# Patient Record
Sex: FEMALE | Race: WHITE | Employment: FULL TIME | URBAN - METROPOLITAN AREA
[De-identification: names, ages, dates, MRNs, and addresses within clinical notes are randomized per-mention and may not be internally consistent; named-entity substitution may affect disease eponyms.]

---

## 2019-07-16 ENCOUNTER — OFFICE VISIT (OUTPATIENT)
Dept: FAMILY MEDICINE CLINIC | Age: 34
End: 2019-07-16

## 2019-07-16 ENCOUNTER — HOSPITAL ENCOUNTER (OUTPATIENT)
Dept: LAB | Age: 34
Discharge: HOME | End: 2019-07-16
Payer: COMMERCIAL

## 2019-07-16 VITALS
BODY MASS INDEX: 30.05 KG/M2 | TEMPERATURE: 98.4 F | HEIGHT: 64 IN | SYSTOLIC BLOOD PRESSURE: 127 MMHG | RESPIRATION RATE: 18 BRPM | HEART RATE: 121 BPM | OXYGEN SATURATION: 98 % | DIASTOLIC BLOOD PRESSURE: 84 MMHG | WEIGHT: 176 LBS

## 2019-07-16 DIAGNOSIS — E06.3 HASHIMOTO'S DISEASE: Primary | ICD-10-CM

## 2019-07-16 DIAGNOSIS — R00.0 TACHYCARDIA: ICD-10-CM

## 2019-07-16 DIAGNOSIS — E06.3 HASHIMOTO'S DISEASE: ICD-10-CM

## 2019-07-16 LAB
T4 FREE SERPL-MCNC: 0.9 NG/DL (ref 0.7–1.5)
TSH SERPL DL<=0.05 MIU/L-ACNC: 0.03 UIU/ML (ref 0.36–3.74)

## 2019-07-16 PROCEDURE — 36415 COLL VENOUS BLD VENIPUNCTURE: CPT

## 2019-07-16 PROCEDURE — 84443 ASSAY THYROID STIM HORMONE: CPT

## 2019-07-16 PROCEDURE — 84439 ASSAY OF FREE THYROXINE: CPT

## 2019-07-16 PROCEDURE — 93005 ELECTROCARDIOGRAM TRACING: CPT

## 2019-07-16 RX ORDER — LIOTHYRONINE SODIUM 5 UG/1
5 TABLET ORAL DAILY
COMMUNITY
End: 2020-06-30 | Stop reason: SDUPTHER

## 2019-07-16 RX ORDER — LEVOTHYROXINE SODIUM 88 UG/1
TABLET ORAL
Refills: 2 | COMMUNITY
Start: 2019-05-02 | End: 2020-02-28 | Stop reason: SDUPTHER

## 2019-07-16 RX ORDER — AMLODIPINE AND BENAZEPRIL HYDROCHLORIDE 5; 10 MG/1; MG/1
1 CAPSULE ORAL DAILY
COMMUNITY
End: 2019-07-22 | Stop reason: SDUPTHER

## 2019-07-16 RX ORDER — ATENOLOL 50 MG/1
50 TABLET ORAL DAILY
Qty: 30 TAB | Refills: 0 | Status: SHIPPED | OUTPATIENT
Start: 2019-07-16 | End: 2019-08-13 | Stop reason: SDUPTHER

## 2019-07-16 NOTE — PROGRESS NOTES
HPI  Mariann Jasso is a 29 y.o. female who presents today to establish care with new provider. Pt recently relocated to [facility] following a recent divorce. Pt is under the care of an endocrinologist, history of hashimoto's disease. Pt was last seen by endocrinologist in February 2019, last thyroid labs were in October 2018. Pt has been off beta blockers for tachycardia for about 2 years and notes when she was off the medication her heart rate was improved with better management of her thyroid disease. Pt is currently just taking prescriptive medication for thyroid condition and HTN. Pt denies chest pain, SOB, fatigue, dizziness, swelling to lower extremities and palpitations. Pt denies any acute medical concerns today. Current Outpatient Medications   Medication Sig Dispense Refill    levothyroxine (SYNTHROID) 88 mcg tablet TK 1 T PO QAM  2    liothyronine (CYTOMEL) 5 mcg tablet Take 5 mcg by mouth daily.  amLODIPine-benazepril (LOTREL) 5-10 mg per capsule Take 1 Cap by mouth daily.  atenolol (TENORMIN) 100 mg tablet Take 100 mg by mouth daily.  atenolol (TENORMIN) 50 mg tablet Take 50 mg by mouth daily. Allergies   Allergen Reactions    Latex Rash       SUBJECTIVE:  Review of Systems   Constitutional: Negative for chills, fever and malaise/fatigue. Eyes: Negative for blurred vision. Respiratory: Negative for shortness of breath. Cardiovascular: Negative for chest pain, palpitations and leg swelling. Gastrointestinal: Negative for abdominal pain, diarrhea, nausea and vomiting. Genitourinary: Negative for dysuria, frequency and urgency. Musculoskeletal: Negative for falls and myalgias. Neurological: Negative for dizziness, tingling, sensory change and headaches.         OBJECTIVE:  Visit Vitals  /84 (BP 1 Location: Left arm, BP Patient Position: Sitting)   Pulse (!) 121   Temp 98.4 °F (36.9 °C) (Oral)   Resp 18   Ht 5' 4\" (1.626 m)   Wt 176 lb (79.8 kg) SpO2 98%   BMI 30.21 kg/m²      I have reviewed/discussed the above normal BMI with the patient. I have recommended the following interventions: dietary management education, guidance, and counseling, encourage exercise and monitor weight . Physical Exam   Constitutional: She is oriented to person, place, and time and well-developed, well-nourished, and in no distress. HENT:   Head: Normocephalic. Eyes: Pupils are equal, round, and reactive to light. Conjunctivae and EOM are normal.   Neck: No thyromegaly present. Cardiovascular: Normal rate, regular rhythm and normal heart sounds. Pulmonary/Chest: Effort normal and breath sounds normal. She has no wheezes. She has no rales. She exhibits no tenderness. Abdominal: Soft. Bowel sounds are normal. She exhibits no distension. There is no tenderness. There is no guarding. Musculoskeletal: She exhibits no edema. Neurological: She is alert and oriented to person, place, and time. Gait normal.   Skin: Skin is warm and dry. No erythema. Psychiatric: Mood and affect normal.   Nursing note and vitals reviewed. ASSESSMENT:  Diagnoses and all orders for this visit:    1. Hashimoto's disease  -     TSH 3RD GENERATION; Future  -     T4, FREE; Future    2. Tachycardia  -     atenolol (TENORMIN) 50 mg tablet; Take 1 Tab by mouth daily.  -     EKG, 12 LEAD, INITIAL; Future    PLAN:  Labs today  EKG ordered  Restart Atenolol for tachycardia    I have discussed the diagnosis with the patient and the intended plan as seen in the above orders. The patient has received an after-visit summary and questions were answered concerning future plans. I have discussed medication side effects and warnings with the patient as well. Patient will call for further questions. Follow-up and Dispositions    · Return in about 1 month (around 8/13/2019) for tachycardia.        Caprice Worthington NP

## 2019-07-16 NOTE — PROGRESS NOTES
Chief Complaint   Patient presents with    Medication Refill    Establish Care     1. Have you been to the ER, urgent care clinic since your last visit? Hospitalized since your last visit? No    2. Have you seen or consulted any other health care providers outside of the [address] [place] [address] since your last visit? Include any pap smears or colon screening.  No

## 2019-07-16 NOTE — PATIENT INSTRUCTIONS
Hashimoto's Thyroiditis: Care Instructions  Your Care Instructions    Hashimoto's thyroiditis is a problem with the thyroid gland. The thyroid gland, which is in your neck, controls the way your body uses energy. Sometimes the disease causes the gland to make too much thyroid hormone (thyrotoxicosis). This can make you feel nervous, lose weight, and have many loose bowel movements. You may also have a fast heartbeat. But as the disease progresses, the gland usually does not make enough thyroid hormone. This can cause you to feel tired and have dry skin and thinning hair. Most people with Hashimoto's are diagnosed when they have these symptoms. You may need to take medicine if you have symptoms or if your thyroid hormone level is not normal. Most people with Hashimoto's thyroiditis need to take medicine for the rest of their lives. Follow-up care is a key part of your treatment and safety. Be sure to make and go to all appointments, and call your doctor if you are having problems. It's also a good idea to know your test results and keep a list of the medicines you take. How can you care for yourself at home? · Take your medicines exactly as prescribed. Call your doctor if you think you are having a problem with your medicine. You will get more details on the specific medicines your doctor prescribes. When should you call for help? Call 911 anytime you think you may need emergency care.  For example, call if:    · You passed out (lost consciousness).     · You have severe trouble breathing.     · You have a very slow heartbeat (less than 60 beats a minute).     · You have a low body temperature (95°F or below).    Watch closely for changes in your health, and be sure to contact your doctor if:    · You gain weight even though you are eating normally or less than usual.     · You feel extremely weak or tired.     · You have new changes in your skin, nails, or hair, or the changes get worse.     · You notice that your thyroid gland has grown or changed in size.     · You have constipation that is new or that gets worse.     · You cannot stand cold temperatures.     · You have heavy or irregular menstrual periods.     · You have other new symptoms. Where can you learn more? Go to http://stanley-bud.info/. Enter K454 in the search box to learn more about \"Hashimoto's Thyroiditis: Care Instructions. \"  Current as of: March 14, 2018  Content Version: 11.9  © 5941-8737 [employer]. Care instructions adapted under license by [employer] (which disclaims liability or warranty for this information). If you have questions about a medical condition or this instruction, always ask your healthcare professional. [address] any warranty or liability for your use of this information.

## 2019-07-17 LAB
ATRIAL RATE: 98 BPM
CALCULATED P AXIS, ECG09: 38 DEGREES
CALCULATED R AXIS, ECG10: 40 DEGREES
CALCULATED T AXIS, ECG11: 25 DEGREES
DIAGNOSIS, 93000: NORMAL
P-R INTERVAL, ECG05: 150 MS
Q-T INTERVAL, ECG07: 348 MS
QRS DURATION, ECG06: 92 MS
QTC CALCULATION (BEZET), ECG08: 444 MS
VENTRICULAR RATE, ECG03: 98 BPM

## 2019-08-13 ENCOUNTER — OFFICE VISIT (OUTPATIENT)
Dept: FAMILY MEDICINE CLINIC | Age: 34
End: 2019-08-13

## 2019-08-13 VITALS
OXYGEN SATURATION: 96 % | WEIGHT: 174.8 LBS | DIASTOLIC BLOOD PRESSURE: 77 MMHG | SYSTOLIC BLOOD PRESSURE: 113 MMHG | HEART RATE: 70 BPM | RESPIRATION RATE: 18 BRPM | HEIGHT: 64 IN | BODY MASS INDEX: 29.84 KG/M2 | TEMPERATURE: 98.3 F

## 2019-08-13 DIAGNOSIS — R00.0 TACHYCARDIA: ICD-10-CM

## 2019-08-13 DIAGNOSIS — I10 ESSENTIAL HYPERTENSION: Primary | ICD-10-CM

## 2019-08-13 RX ORDER — AMLODIPINE AND BENAZEPRIL HYDROCHLORIDE 5; 10 MG/1; MG/1
1 CAPSULE ORAL DAILY
Qty: 90 CAP | Refills: 1 | Status: SHIPPED | OUTPATIENT
Start: 2019-08-13 | End: 2020-02-28 | Stop reason: SDUPTHER

## 2019-08-13 RX ORDER — ATENOLOL 50 MG/1
50 TABLET ORAL DAILY
Qty: 90 TAB | Refills: 1 | Status: SHIPPED | OUTPATIENT
Start: 2019-08-13 | End: 2020-02-28 | Stop reason: SDUPTHER

## 2019-08-13 NOTE — PATIENT INSTRUCTIONS
High Blood Pressure: Care Instructions  Overview    It's normal for blood pressure to go up and down throughout the day. But if it stays up, you have high blood pressure. Another name for high blood pressure is hypertension. Despite what a lot of people think, high blood pressure usually doesn't cause headaches or make you feel dizzy or lightheaded. It usually has no symptoms. But it does increase your risk of stroke, heart attack, and other problems. You and your doctor will talk about your risks of these problems based on your blood pressure. Your doctor will give you a goal for your blood pressure. Your goal will be based on your health and your age. Lifestyle changes, such as eating healthy and being active, are always important to help lower blood pressure. You might also take medicine to reach your blood pressure goal.  Follow-up care is a key part of your treatment and safety. Be sure to make and go to all appointments, and call your doctor if you are having problems. It's also a good idea to know your test results and keep a list of the medicines you take. How can you care for yourself at home? Medical treatment  · If you stop taking your medicine, your blood pressure will go back up. You may take one or more types of medicine to lower your blood pressure. Be safe with medicines. Take your medicine exactly as prescribed. Call your doctor if you think you are having a problem with your medicine. · Talk to your doctor before you start taking aspirin every day. Aspirin can help certain people lower their risk of a heart attack or stroke. But taking aspirin isn't right for everyone, because it can cause serious bleeding. · See your doctor regularly. You may need to see the doctor more often at first or until your blood pressure comes down. · If you are taking blood pressure medicine, talk to your doctor before you take decongestants or anti-inflammatory medicine, such as ibuprofen.  Some of these medicines can raise blood pressure. · Learn how to check your blood pressure at home. Lifestyle changes  · Stay at a healthy weight. This is especially important if you put on weight around the waist. Losing even 10 pounds can help you lower your blood pressure. · If your doctor recommends it, get more exercise. Walking is a good choice. Bit by bit, increase the amount you walk every day. Try for at least 30 minutes on most days of the week. You also may want to swim, bike, or do other activities. · Avoid or limit alcohol. Talk to your doctor about whether you can drink any alcohol. · Try to limit how much sodium you eat to less than 2,300 milligrams (mg) a day. Your doctor may ask you to try to eat less than 1,500 mg a day. · Eat plenty of fruits (such as bananas and oranges), vegetables, legumes, whole grains, and low-fat dairy products. · Lower the amount of saturated fat in your diet. Saturated fat is found in animal products such as milk, cheese, and meat. Limiting these foods may help you lose weight and also lower your risk for heart disease. · Do not smoke. Smoking increases your risk for heart attack and stroke. If you need help quitting, talk to your doctor about stop-smoking programs and medicines. These can increase your chances of quitting for good. When should you call for help? Call 911 anytime you think you may need emergency care. This may mean having symptoms that suggest that your blood pressure is causing a serious heart or blood vessel problem. Your blood pressure may be over 180/120.   For example, call 911 if:    · You have symptoms of a heart attack. These may include:  ? Chest pain or pressure, or a strange feeling in the chest.  ? Sweating. ? Shortness of breath. ? Nausea or vomiting. ? Pain, pressure, or a strange feeling in the back, neck, jaw, or upper belly or in one or both shoulders or arms. ? Lightheadedness or sudden weakness.   ? A fast or irregular heartbeat.     · You have symptoms of a stroke. These may include:  ? Sudden numbness, tingling, weakness, or loss of movement in your face, arm, or leg, especially on only one side of your body. ? Sudden vision changes. ? Sudden trouble speaking. ? Sudden confusion or trouble understanding simple statements. ? Sudden problems with walking or balance. ? A sudden, severe headache that is different from past headaches.     · You have severe back or belly pain.    Do not wait until your blood pressure comes down on its own. Get help right away.   Call your doctor now or seek immediate care if:    · Your blood pressure is much higher than normal (such as 180/120 or higher), but you don't have symptoms.     · You think high blood pressure is causing symptoms, such as:  ? Severe headache.  ? Blurry vision.    Watch closely for changes in your health, and be sure to contact your doctor if:    · Your blood pressure measures higher than your doctor recommends at least 2 times. That means the top number is higher or the bottom number is higher, or both.     · You think you may be having side effects from your blood pressure medicine. Where can you learn more? Go to http://stanley-bud.info/. Enter M607 in the search box to learn more about \"High Blood Pressure: Care Instructions. \"  Current as of: July 22, 2018  Content Version: 12.1  © 5275-5429 [employer]. Care instructions adapted under license by [employer] (which disclaims liability or warranty for this information). If you have questions about a medical condition or this instruction, always ask your healthcare professional. [place] [address] any warranty or liability for your use of this information.

## 2019-08-13 NOTE — PROGRESS NOTES
Chief Complaint   Patient presents with    Irregular Heart Beat     1. Have you been to the ER, urgent care clinic since your last visit? Hospitalized since your last visit? No    2. Have you seen or consulted any other health care providers outside of the [address] [place] [address] since your last visit? Include any pap smears or colon screening.  No

## 2019-08-17 NOTE — PROGRESS NOTES
TRI Castillo is a 29 y.o. female who presents today for HTN and tachycardia follow up. Hypertension ROS: taking medications as instructed, no medication side effects noted, no TIA's, no chest pain on exertion, no dyspnea on exertion, no swelling of ankles. Pt denies any acute medical concerns today. Pt had EKG done for tachycardia which was ordered last visit. Current Outpatient Medications   Medication Sig Dispense Refill    atenolol (TENORMIN) 50 mg tablet Take 1 Tab by mouth daily. 90 Tab 1    amLODIPine-benazepril (LOTREL) 5-10 mg per capsule Take 1 Cap by mouth daily. 90 Cap 1    levothyroxine (SYNTHROID) 88 mcg tablet TK 1 T PO QAM  2    liothyronine (CYTOMEL) 5 mcg tablet Take 5 mcg by mouth daily. Allergies   Allergen Reactions    Latex Rash       SUBJECTIVE:  Review of Systems   Constitutional: Negative for chills, fever and malaise/fatigue. Eyes: Negative for blurred vision. Respiratory: Negative for shortness of breath. Cardiovascular: Negative for chest pain, palpitations and leg swelling. Gastrointestinal: Negative for abdominal pain, diarrhea, nausea and vomiting. Genitourinary: Negative for dysuria, frequency and urgency. Musculoskeletal: Negative for falls and myalgias. Neurological: Negative for dizziness, tingling, sensory change and headaches. Psychiatric/Behavioral: Negative for depression and suicidal ideas. The patient is not nervous/anxious. OBJECTIVE:  Visit Vitals  /77 (BP 1 Location: Left arm, BP Patient Position: Sitting)   Pulse 70   Temp 98.3 °F (36.8 °C) (Oral)   Resp 18   Ht 5' 4\" (1.626 m)   Wt 174 lb 12.8 oz (79.3 kg)   LMP 07/30/2019 (Approximate)   SpO2 96%   BMI 30.00 kg/m²      I have reviewed/discussed the above normal BMI with the patient. I have recommended the following interventions: dietary management education, guidance, and counseling, encourage exercise and monitor weight .      Physical Exam   Constitutional: She is oriented to person, place, and time and well-developed, well-nourished, and in no distress. HENT:   Head: Normocephalic. Eyes: Pupils are equal, round, and reactive to light. Conjunctivae and EOM are normal.   Neck: No thyromegaly present. Cardiovascular: Normal rate, regular rhythm and normal heart sounds. Pulmonary/Chest: Effort normal and breath sounds normal. She has no wheezes. She has no rales. She exhibits no tenderness. Musculoskeletal: She exhibits no edema. Neurological: She is alert and oriented to person, place, and time. Gait normal.   Skin: Skin is warm and dry. No erythema. Psychiatric: Mood and affect normal.   Nursing note and vitals reviewed. ASSESSMENT:  Diagnoses and all orders for this visit:    1. Essential hypertension  -     amLODIPine-benazepril (LOTREL) 5-10 mg per capsule; Take 1 Cap by mouth daily. 2. Tachycardia  -     atenolol (TENORMIN) 50 mg tablet; Take 1 Tab by mouth daily. PLAN:  Medication refill  Reviewed EKG results  Continue current medication regimen    I have discussed the diagnosis with the patient and the intended plan as seen in the above orders. The patient has received an after-visit summary and questions were answered concerning future plans. I have discussed medication side effects and warnings with the patient as well. Patient will call for further questions. Follow-up and Dispositions    · Return in about 6 months (around 2/13/2020) for follow up.        Umesh Louie NP

## 2020-02-28 DIAGNOSIS — I10 ESSENTIAL HYPERTENSION: ICD-10-CM

## 2020-02-28 DIAGNOSIS — R00.0 TACHYCARDIA: ICD-10-CM

## 2020-02-28 RX ORDER — AMLODIPINE AND BENAZEPRIL HYDROCHLORIDE 5; 10 MG/1; MG/1
1 CAPSULE ORAL DAILY
Qty: 90 CAP | Refills: 0 | Status: SHIPPED | OUTPATIENT
Start: 2020-02-28 | End: 2020-04-28 | Stop reason: SDUPTHER

## 2020-02-28 RX ORDER — LEVOTHYROXINE SODIUM 88 UG/1
TABLET ORAL
Qty: 30 TAB | Refills: 3 | Status: SHIPPED | OUTPATIENT
Start: 2020-02-28 | End: 2020-07-15 | Stop reason: SDUPTHER

## 2020-02-28 RX ORDER — ATENOLOL 50 MG/1
50 TABLET ORAL DAILY
Qty: 90 TAB | Refills: 0 | Status: SHIPPED | OUTPATIENT
Start: 2020-02-28 | End: 2020-04-28 | Stop reason: SDUPTHER

## 2020-04-28 ENCOUNTER — VIRTUAL VISIT (OUTPATIENT)
Dept: FAMILY MEDICINE CLINIC | Age: 35
End: 2020-04-28

## 2020-04-28 ENCOUNTER — PATIENT MESSAGE (OUTPATIENT)
Dept: FAMILY MEDICINE CLINIC | Age: 35
End: 2020-04-28

## 2020-04-28 DIAGNOSIS — I10 ESSENTIAL HYPERTENSION: ICD-10-CM

## 2020-04-28 DIAGNOSIS — I10 ESSENTIAL HYPERTENSION: Primary | ICD-10-CM

## 2020-04-28 DIAGNOSIS — R00.0 TACHYCARDIA: ICD-10-CM

## 2020-04-28 DIAGNOSIS — E06.3 HASHIMOTO'S DISEASE: ICD-10-CM

## 2020-04-28 RX ORDER — AMLODIPINE AND BENAZEPRIL HYDROCHLORIDE 5; 10 MG/1; MG/1
1 CAPSULE ORAL DAILY
Qty: 30 CAP | Refills: 0 | Status: SHIPPED | OUTPATIENT
Start: 2020-04-28 | End: 2020-08-28

## 2020-04-28 RX ORDER — ATENOLOL 50 MG/1
50 TABLET ORAL DAILY
Qty: 30 TAB | Refills: 0 | Status: SHIPPED | OUTPATIENT
Start: 2020-04-28 | End: 2020-06-30

## 2020-04-28 NOTE — PROGRESS NOTES
Walter Prado is a 36 y.o. female who was seen by synchronous (real-time) audio-video technology on 4/28/2020. Consent: Walter Prado, who was seen by synchronous (real-time) audio-video technology, and/or her healthcare decision maker, is aware that this patient-initiated, Telehealth encounter on 4/28/2020 is a billable service, with coverage as determined by her insurance carrier. She is aware that she may receive a bill and has provided verbal consent to proceed: Yes. Assessment & Plan:   Diagnoses and all orders for this visit:    1. Essential hypertension  -     LIPID PANEL; Future  -     METABOLIC PANEL, COMPREHENSIVE; Future    2. Tachycardia    3. Hashimoto's disease  -     T4, FREE; Future  -     TSH 3RD GENERATION; Future      Instructed to schedule lab appt to have fasting labs drawn    Follow-up and Dispositions    · Return in about 3 months (around 7/28/2020) for HTN. I spent at least 16 minutes on this visit with this established patient. [phone]  Subjective: Walter Prado is a 36 y.o. female who was seen for Hypertension    Hypertension ROS: taking medications as instructed, no medication side effects noted, no TIA's, no chest pain on exertion, no dyspnea on exertion, no swelling of ankles. Hypothyroidism ros: denies fatigue, weight changes, heat/cold intolerance, bowel/skin changes or CVS symptoms. Patient is compliant with all meds. Medication side effects none. Patient is followed by Dr. Tamara Ganser for the her thyroid. Prior to Admission medications    Medication Sig Start Date End Date Taking? Authorizing Provider   amLODIPine-benazepril (LOTREL) 5-10 mg per capsule Take 1 Cap by mouth daily. 4/28/20   Missael Jacobson MD   atenoloL (TENORMIN) 50 mg tablet Take 1 Tab by mouth daily. 4/28/20   Missael Jacobson MD   levothyroxine (SYNTHROID) 88 mcg tablet TK 1 T PO QAM 2/28/20   Antonio Nair NP   atenoloL (TENORMIN) 50 mg tablet Take 1 Tab by mouth daily.  2/28/20 4/28/20 Roseanna Gillespie, NP   amLODIPine-benazepril (LOTREL) 5-10 mg per capsule Take 1 Cap by mouth daily. 2/28/20 4/28/20  Roseanna Gillespie, NP   liothyronine (CYTOMEL) 5 mcg tablet Take 5 mcg by mouth daily. Provider, Historical     Allergies   Allergen Reactions    Latex Rash       There is no problem list on file for this patient. There are no active problems to display for this patient. Current Outpatient Medications   Medication Sig Dispense Refill    amLODIPine-benazepril (LOTREL) 5-10 mg per capsule Take 1 Cap by mouth daily. 30 Cap 0    atenoloL (TENORMIN) 50 mg tablet Take 1 Tab by mouth daily. 30 Tab 0    levothyroxine (SYNTHROID) 88 mcg tablet TK 1 T PO QAM 30 Tab 3    liothyronine (CYTOMEL) 5 mcg tablet Take 5 mcg by mouth daily. Allergies   Allergen Reactions    Latex Rash     Past Medical History:   Diagnosis Date    Hypertension     Tachycardia, unspecified     being evaluated by cardiologist.   Connye Sole Thyroid disease      Past Surgical History:   Procedure Laterality Date    HX TUBAL LIGATION       Family History   Problem Relation Age of Onset    Diabetes Mother     Heart Disease Mother     Hypertension Mother      Social History     Tobacco Use    Smoking status: Never Smoker    Smokeless tobacco: Never Used   Substance Use Topics    Alcohol use: Yes     Alcohol/week: 1.0 standard drinks     Types: 1 Standard drinks or equivalent per week       Review of Systems   Constitutional: Negative for chills and fever. Respiratory: Negative for shortness of breath. Cardiovascular: Negative for chest pain and palpitations. Neurological: Negative for dizziness and headaches. Objective: There were no vitals taken for this visit.    General: alert, cooperative, no distress   Mental  status: normal mood, behavior, speech, dress, motor activity, and thought processes, able to follow commands   HENT: NCAT   Neck: no visualized mass   Resp: no respiratory distress   Neuro: no gross deficits   Skin: no discoloration or lesions of concern on visible areas   Psychiatric: normal affect, consistent with stated mood, no evidence of hallucinations     Additional exam findings: We discussed the expected course, resolution and complications of the diagnosis(es) in detail. Medication risks, benefits, costs, interactions, and alternatives were discussed as indicated. I advised her to contact the office if her condition worsens, changes or fails to improve as anticipated. She expressed understanding with the diagnosis(es) and plan. Dallas Belle is a 36 y.o. female who was evaluated by a video visit encounter for concerns as above. Patient identification was verified prior to start of the visit. A caregiver was present when appropriate. Due to this being a TeleHealth encounter ([facility]), evaluation of the following organ systems was limited: Vitals/Constitutional/EENT/Resp/CV/GI//MS/Neuro/Skin/Heme-Lymph-Imm. Pursuant to the emergency declaration under the [facility][note 3][facility] waiver authority and the [employer] Act, this Virtual  Visit was conducted, with patient's (and/or legal guardian's) consent, to reduce the patient's risk of exposure to COVID-19 and provide necessary medical care. Services were provided through a video synchronous discussion virtually to substitute for in-person clinic visit. Patient and provider were located at their individual homes.       Raine Montejo NP

## 2020-04-28 NOTE — TELEPHONE ENCOUNTER
Last Visit: 8/13/19 with SMITHA Delgado  Next Appointment: advised to f/u in 6 months  Previous Refill Encounter(s): 2/28/20 #90    Requested Prescriptions     Pending Prescriptions Disp Refills    amLODIPine-benazepril (LOTREL) 5-10 mg per capsule 30 Cap 0     Sig: Take 1 Cap by mouth daily.  atenoloL (TENORMIN) 50 mg tablet 30 Tab 0     Sig: Take 1 Tab by mouth daily.

## 2020-04-28 NOTE — TELEPHONE ENCOUNTER
----- Message from [facility] sent at 4/28/2020  1:40 PM EDT -----  Regarding: Prescription Question  Contact: [phone]    Can you please send a refill for     Atenolol 50mg and  Amlodipine-Benazepril 5-10    to. .  [address] [place]  [address] [place] [address]  Thank you,  [facility]  [phone]

## 2020-05-01 ENCOUNTER — HOSPITAL ENCOUNTER (OUTPATIENT)
Dept: LAB | Age: 35
Discharge: HOME | End: 2020-05-01
Payer: COMMERCIAL

## 2020-05-01 DIAGNOSIS — I10 ESSENTIAL HYPERTENSION: ICD-10-CM

## 2020-05-01 DIAGNOSIS — E06.3 HASHIMOTO'S DISEASE: ICD-10-CM

## 2020-05-01 LAB
ALBUMIN SERPL-MCNC: 4.1 G/DL (ref 3.4–5)
ALBUMIN/GLOB SERPL: 1.2 {RATIO} (ref 0.8–1.7)
ALP SERPL-CCNC: 87 U/L (ref 45–117)
ALT SERPL-CCNC: 26 U/L (ref 13–56)
ANION GAP SERPL CALC-SCNC: 8 MMOL/L (ref 3–18)
AST SERPL-CCNC: 15 U/L (ref 10–38)
BILIRUB SERPL-MCNC: 0.3 MG/DL (ref 0.2–1)
BUN SERPL-MCNC: 8 MG/DL (ref 7–18)
BUN/CREAT SERPL: 14 (ref 12–20)
CALCIUM SERPL-MCNC: 8.6 MG/DL (ref 8.5–10.1)
CHLORIDE SERPL-SCNC: 108 MMOL/L (ref 100–111)
CHOLEST SERPL-MCNC: 179 MG/DL
CO2 SERPL-SCNC: 25 MMOL/L (ref 21–32)
CREAT SERPL-MCNC: 0.58 MG/DL (ref 0.6–1.3)
GLOBULIN SER CALC-MCNC: 3.4 G/DL (ref 2–4)
GLUCOSE SERPL-MCNC: 78 MG/DL (ref 74–99)
HDLC SERPL-MCNC: 37 MG/DL (ref 40–60)
HDLC SERPL: 4.8 {RATIO} (ref 0–5)
LDLC SERPL CALC-MCNC: 105.6 MG/DL (ref 0–100)
LIPID PROFILE,FLP: ABNORMAL
POTASSIUM SERPL-SCNC: 4 MMOL/L (ref 3.5–5.5)
PROT SERPL-MCNC: 7.5 G/DL (ref 6.4–8.2)
SODIUM SERPL-SCNC: 141 MMOL/L (ref 136–145)
T4 FREE SERPL-MCNC: 1 NG/DL (ref 0.7–1.5)
TRIGL SERPL-MCNC: 182 MG/DL (ref ?–150)
TSH SERPL DL<=0.05 MIU/L-ACNC: 0.38 UIU/ML (ref 0.36–3.74)
VLDLC SERPL CALC-MCNC: 36.4 MG/DL

## 2020-05-01 PROCEDURE — 80061 LIPID PANEL: CPT

## 2020-05-01 PROCEDURE — 36415 COLL VENOUS BLD VENIPUNCTURE: CPT

## 2020-05-01 PROCEDURE — 84443 ASSAY THYROID STIM HORMONE: CPT

## 2020-05-01 PROCEDURE — 80053 COMPREHEN METABOLIC PANEL: CPT

## 2020-05-01 PROCEDURE — 84439 ASSAY OF FREE THYROXINE: CPT

## 2020-06-29 DIAGNOSIS — R00.0 TACHYCARDIA: ICD-10-CM

## 2020-06-30 NOTE — TELEPHONE ENCOUNTER
Liothyronine is listed as historical. Please sign if appropriate. Last Visit: 4/28/20 with SMITHA Marie  Next Appointment: Advised to follow-up in 3 months  Previous Refill Encounter(s): 4/28/20 Tenormin #30    Requested Prescriptions     Pending Prescriptions Disp Refills    atenoloL (TENORMIN) 50 mg tablet [Pharmacy Med Name: ATENOLOL 50 MG TABLET] 30 Tab 1     Sig: Take 1 Tab by mouth daily.  liothyronine (CYTOMEL) 5 mcg tablet 30 Tab 1     Sig: Take 1 Tab by mouth daily.

## 2020-07-01 RX ORDER — ATENOLOL 50 MG/1
50 TABLET ORAL DAILY
Qty: 30 TAB | Refills: 1 | Status: SHIPPED | OUTPATIENT
Start: 2020-07-01 | End: 2020-08-28

## 2020-07-01 RX ORDER — LIOTHYRONINE SODIUM 5 UG/1
5 TABLET ORAL DAILY
Qty: 30 TAB | Refills: 1 | Status: SHIPPED | OUTPATIENT
Start: 2020-07-01 | End: 2020-08-28

## 2020-07-15 ENCOUNTER — PATIENT MESSAGE (OUTPATIENT)
Dept: FAMILY MEDICINE CLINIC | Age: 35
End: 2020-07-15

## 2020-07-15 NOTE — TELEPHONE ENCOUNTER
From: Rupa Marcelo  To: [facility]  Sent: 7/15/2020 11:17 AM EDT  Subject: Prescription Question    Please refill levothyroxine at [facility].       [address], [address] [place] [address] Str.    Thank you,  Rupa Marcelo  [phone]

## 2020-07-15 NOTE — TELEPHONE ENCOUNTER
Last Visit: 4/28/20 with NP Timi Gonzalez  Next Appointment: Advised to follow-up in 3 months  Previous Refill Encounter(s): 2/28/20 #30 with 3 refills    Requested Prescriptions     Pending Prescriptions Disp Refills    levothyroxine (SYNTHROID) 88 mcg tablet 30 Tab 3     Sig: Take 1 Tab by mouth every morning.

## 2020-07-20 RX ORDER — LEVOTHYROXINE SODIUM 88 UG/1
88 TABLET ORAL
Qty: 30 TAB | Refills: 3 | Status: SHIPPED | OUTPATIENT
Start: 2020-07-20 | End: 2020-11-27 | Stop reason: SDUPTHER

## 2020-08-26 DIAGNOSIS — R00.0 TACHYCARDIA: ICD-10-CM

## 2020-08-26 DIAGNOSIS — I10 ESSENTIAL HYPERTENSION: ICD-10-CM

## 2020-08-26 RX ORDER — LIOTHYRONINE SODIUM 5 UG/1
5 TABLET ORAL DAILY
Qty: 30 TAB | Refills: 1 | Status: CANCELLED | OUTPATIENT
Start: 2020-08-26

## 2020-08-26 RX ORDER — ATENOLOL 50 MG/1
50 TABLET ORAL DAILY
Qty: 30 TAB | Refills: 1 | Status: CANCELLED | OUTPATIENT
Start: 2020-08-26

## 2020-08-26 RX ORDER — AMLODIPINE AND BENAZEPRIL HYDROCHLORIDE 5; 10 MG/1; MG/1
1 CAPSULE ORAL DAILY
Qty: 30 CAP | Refills: 0 | Status: CANCELLED | OUTPATIENT
Start: 2020-08-26

## 2020-10-22 DIAGNOSIS — R00.0 TACHYCARDIA: ICD-10-CM

## 2020-10-22 NOTE — TELEPHONE ENCOUNTER
Last Visit: 4/28/20 with SMITHA Miles  Next Appointment: Advised to follow-up in 3 months  Previous Refill Encounter(s): 8/31/20 #30 with 1 refills    Requested Prescriptions     Pending Prescriptions Disp Refills    atenoloL (TENORMIN) 50 mg tablet 30 Tab 1     Sig: Take 1 Tab by mouth daily.  liothyronine (CYTOMEL) 5 mcg tablet 30 Tab 1     Sig: Take 1 Tab by mouth daily.

## 2020-10-27 RX ORDER — ATENOLOL 50 MG/1
50 TABLET ORAL DAILY
Qty: 30 TAB | Refills: 1 | Status: SHIPPED | OUTPATIENT
Start: 2020-10-27 | End: 2020-12-26 | Stop reason: SDUPTHER

## 2020-10-27 RX ORDER — LIOTHYRONINE SODIUM 5 UG/1
5 TABLET ORAL DAILY
Qty: 30 TAB | Refills: 1 | Status: SHIPPED | OUTPATIENT
Start: 2020-10-27 | End: 2020-12-26 | Stop reason: SDUPTHER

## 2020-10-27 NOTE — TELEPHONE ENCOUNTER
Refill approved x30 days.   Advised patient future refills need to be obtained from endocrinologist.

## 2020-12-26 DIAGNOSIS — R00.0 TACHYCARDIA: ICD-10-CM

## 2020-12-28 NOTE — TELEPHONE ENCOUNTER
Last Visit: 4/28/20 with SMITHA Pinedo  Next Appointment: none  Previous Refill Encounter(s): 10/27/20 #30 with 1 refill    Requested Prescriptions     Pending Prescriptions Disp Refills    atenoloL (TENORMIN) 50 mg tablet 30 Tab 1     Sig: Take 1 Tab by mouth daily.  liothyronine (CYTOMEL) 5 mcg tablet 30 Tab 1     Sig: Take 1 Tab by mouth daily.

## 2020-12-31 RX ORDER — LIOTHYRONINE SODIUM 5 UG/1
5 TABLET ORAL DAILY
Qty: 30 TAB | Refills: 1 | Status: SHIPPED | OUTPATIENT
Start: 2020-12-31 | End: 2021-02-25 | Stop reason: SDUPTHER

## 2020-12-31 RX ORDER — ATENOLOL 50 MG/1
50 TABLET ORAL DAILY
Qty: 30 TAB | Refills: 1 | Status: SHIPPED | OUTPATIENT
Start: 2020-12-31 | End: 2021-02-25 | Stop reason: SDUPTHER

## 2020-12-31 NOTE — TELEPHONE ENCOUNTER
[employer] message sent to patient informing to schedule follow up appt for further refills after current 30-day supply.

## 2021-01-14 ENCOUNTER — VIRTUAL VISIT (OUTPATIENT)
Dept: FAMILY MEDICINE CLINIC | Age: 36
End: 2021-01-14
Payer: COMMERCIAL

## 2021-01-14 DIAGNOSIS — I10 ESSENTIAL HYPERTENSION: ICD-10-CM

## 2021-01-14 DIAGNOSIS — E66.9 OBESITY, CLASS I, BMI 30-34.9: ICD-10-CM

## 2021-01-14 DIAGNOSIS — E06.3 HASHIMOTO'S DISEASE: Primary | ICD-10-CM

## 2021-01-14 PROCEDURE — 99213 OFFICE O/P EST LOW 20 MIN: CPT | Performed by: NURSE PRACTITIONER

## 2021-01-14 NOTE — PROGRESS NOTES
Cesia Ochoa is a 30 y.o. female who was seen by synchronous (real-time) audio-video technology on 1/14/2021 for Thyroid Problem and Hypertension      Assessment & Plan:   Diagnoses and all orders for this visit:    1. Hashimoto's disease  -     TSH 3RD GENERATION; Future  -     T4, FREE; Future  -     REFERRAL TO ENDOCRINOLOGY  -     T3 TOTAL; Future    2. Essential hypertension  -     METABOLIC PANEL, COMPREHENSIVE; Future  -     LIPID PANEL; Future    3. Obesity, Class I, BMI 30-34.9  -     HEMOGLOBIN A1C WITH EAG; Future      Follow-up and Dispositions    · Return in about 4 months (around 5/14/2021) for HTN/thyroid, in office follow up. I spent at least 11 minutes on this visit with this established patient. 712  Subjective:   Hypothyroidism ros: denies fatigue, weight changes, heat/cold intolerance, bowel/skin changes or CVS symptoms, states menstrual cycle has been approx 6 weeks apart. Comments this typically occurs if her thyroid levels are abnormal.  Patient is compliant with all meds. Medication side effects none. Hypertension ROS: taking medications as instructed, no medication side effects noted, patient does not perform home BP monitoring, no TIA's, no chest pain on exertion, no dyspnea on exertion, no swelling of ankles. Patient does not adhere to low sodium diet. She does exercise using YouTube videos. Comments she has not as of late due to injuring her toe a couple of weeks ago. Prior to Admission medications    Medication Sig Start Date End Date Taking? Authorizing Provider   atenoloL (TENORMIN) 50 mg tablet Take 1 Tab by mouth daily. 12/31/20   Alejandro BROWN NP   liothyronine (CYTOMEL) 5 mcg tablet Take 1 Tab by mouth daily. 12/31/20   Alejandro BROWN NP   levothyroxine (SYNTHROID) 88 mcg tablet Take 1 Tab by mouth every morning. 12/1/20   Alejandro BROWN NP   amLODIPine-benazepril (LOTREL) 5-10 mg per capsule Take 1 Cap by mouth daily.  12/1/20   Jayson Garcia NP     There is no problem list on file for this patient. There are no active problems to display for this patient. Current Outpatient Medications   Medication Sig Dispense Refill    atenoloL (TENORMIN) 50 mg tablet Take 1 Tab by mouth daily. 30 Tab 1    liothyronine (CYTOMEL) 5 mcg tablet Take 1 Tab by mouth daily. 30 Tab 1    levothyroxine (SYNTHROID) 88 mcg tablet Take 1 Tab by mouth every morning. 30 Tab 1    amLODIPine-benazepril (LOTREL) 5-10 mg per capsule Take 1 Cap by mouth daily. 30 Cap 1     Allergies   Allergen Reactions    Latex Rash     Past Medical History:   Diagnosis Date    Hypertension     Tachycardia, unspecified     being evaluated by cardiologist.   [facility] Thyroid disease      Past Surgical History:   Procedure Laterality Date    HX TUBAL LIGATION       Family History   Problem Relation Age of Onset    Diabetes Mother     Heart Disease Mother     Hypertension Mother      Social History     Tobacco Use    Smoking status: Never Smoker    Smokeless tobacco: Never Used   Substance Use Topics    Alcohol use: Yes     Alcohol/week: 1.0 standard drinks     Types: 1 Standard drinks or equivalent per week       Review of Systems   Constitutional: Negative for chills and fever. Respiratory: Negative for shortness of breath. Cardiovascular: Negative for chest pain and palpitations. Neurological: Negative for dizziness and headaches. Objective:   No flowsheet data found. General: alert, cooperative, no distress   Mental  status: normal mood, behavior, speech, dress, motor activity, and thought processes, able to follow commands   HENT: NCAT   Neck: no visualized mass   Resp: no respiratory distress   Neuro: no gross deficits   Skin: no discoloration or lesions of concern on visible areas   Psychiatric: normal affect, consistent with stated mood, no evidence of hallucinations     Additional exam findings:        We discussed the expected course, resolution and complications of the diagnosis(es) in detail. Medication risks, benefits, costs, interactions, and alternatives were discussed as indicated. I advised her to contact the office if her condition worsens, changes or fails to improve as anticipated. She expressed understanding with the diagnosis(es) and plan. Cesia Ochoa, who was evaluated through a patient-initiated, synchronous (real-time) audio-video encounter, and/or her healthcare decision maker, is aware that it is a billable service, with coverage as determined by her insurance carrier. She provided verbal consent to proceed: Yes, and patient identification was verified. It was conducted pursuant to the emergency declaration under the [facility], [address] [place] [address] authority and the [employer] and [employer] General Act. A caregiver was present when appropriate. Ability to conduct physical exam was limited. I was in the office. The patient was at home.       Cecilia Sargent NP

## 2021-01-25 DIAGNOSIS — I10 ESSENTIAL HYPERTENSION: ICD-10-CM

## 2021-01-25 RX ORDER — AMLODIPINE AND BENAZEPRIL HYDROCHLORIDE 5; 10 MG/1; MG/1
CAPSULE ORAL
Qty: 30 CAP | Refills: 0 | OUTPATIENT
Start: 2021-01-25

## 2021-01-25 NOTE — TELEPHONE ENCOUNTER
Last Visit: 1/14/21 with SMITHA Renae  Next Appointment: Advised to follow-up in 4 months  Previous Refill Encounter(s): 12/1/20 #30 with 1 refill    Requested Prescriptions     Pending Prescriptions Disp Refills    levothyroxine (SYNTHROID) 88 mcg tablet 90 Tab 1     Sig: Take 1 Tab by mouth every morning.  amLODIPine-benazepril (LOTREL) 5-10 mg per capsule 90 Cap 1     Sig: Take 1 Cap by mouth daily.

## 2021-01-26 ENCOUNTER — APPOINTMENT (OUTPATIENT)
Dept: FAMILY MEDICINE CLINIC | Age: 36
End: 2021-01-26

## 2021-01-26 ENCOUNTER — HOSPITAL ENCOUNTER (OUTPATIENT)
Dept: LAB | Age: 36
Discharge: HOME | End: 2021-01-26
Payer: COMMERCIAL

## 2021-01-26 DIAGNOSIS — I10 ESSENTIAL HYPERTENSION: ICD-10-CM

## 2021-01-26 DIAGNOSIS — E06.3 HASHIMOTO'S DISEASE: ICD-10-CM

## 2021-01-26 DIAGNOSIS — E66.9 OBESITY, CLASS I, BMI 30-34.9: ICD-10-CM

## 2021-01-26 LAB
ALBUMIN SERPL-MCNC: 4.2 G/DL (ref 3.4–5)
ALBUMIN/GLOB SERPL: 1.2 {RATIO} (ref 0.8–1.7)
ALP SERPL-CCNC: 94 U/L (ref 45–117)
ALT SERPL-CCNC: 45 U/L (ref 13–56)
ANION GAP SERPL CALC-SCNC: 5 MMOL/L (ref 3–18)
AST SERPL-CCNC: 19 U/L (ref 10–38)
BILIRUB SERPL-MCNC: 0.5 MG/DL (ref 0.2–1)
BUN SERPL-MCNC: 14 MG/DL (ref 7–18)
BUN/CREAT SERPL: 19 (ref 12–20)
CALCIUM SERPL-MCNC: 8.9 MG/DL (ref 8.5–10.1)
CHLORIDE SERPL-SCNC: 108 MMOL/L (ref 100–111)
CHOLEST SERPL-MCNC: 172 MG/DL
CO2 SERPL-SCNC: 25 MMOL/L (ref 21–32)
CREAT SERPL-MCNC: 0.75 MG/DL (ref 0.6–1.3)
EST. AVERAGE GLUCOSE BLD GHB EST-MCNC: 97 MG/DL
GLOBULIN SER CALC-MCNC: 3.4 G/DL (ref 2–4)
GLUCOSE SERPL-MCNC: 94 MG/DL (ref 74–99)
HBA1C MFR BLD: 5 % (ref 4.2–5.6)
HDLC SERPL-MCNC: 28 MG/DL (ref 40–60)
HDLC SERPL: 6.1 {RATIO} (ref 0–5)
LDLC SERPL CALC-MCNC: ABNORMAL MG/DL (ref 0–100)
LIPID PROFILE,FLP: ABNORMAL
POTASSIUM SERPL-SCNC: 4.4 MMOL/L (ref 3.5–5.5)
PROT SERPL-MCNC: 7.6 G/DL (ref 6.4–8.2)
SODIUM SERPL-SCNC: 138 MMOL/L (ref 136–145)
T4 FREE SERPL-MCNC: 1.1 NG/DL (ref 0.7–1.5)
TRIGL SERPL-MCNC: 429 MG/DL (ref ?–150)
TSH SERPL DL<=0.05 MIU/L-ACNC: 0.78 UIU/ML (ref 0.36–3.74)
VLDLC SERPL CALC-MCNC: ABNORMAL MG/DL

## 2021-01-26 PROCEDURE — 83036 HEMOGLOBIN GLYCOSYLATED A1C: CPT

## 2021-01-26 PROCEDURE — 80061 LIPID PANEL: CPT

## 2021-01-26 PROCEDURE — 84439 ASSAY OF FREE THYROXINE: CPT

## 2021-01-26 PROCEDURE — 36415 COLL VENOUS BLD VENIPUNCTURE: CPT

## 2021-01-26 PROCEDURE — 80053 COMPREHEN METABOLIC PANEL: CPT

## 2021-01-26 PROCEDURE — 84480 ASSAY TRIIODOTHYRONINE (T3): CPT

## 2021-01-26 PROCEDURE — 84443 ASSAY THYROID STIM HORMONE: CPT

## 2021-01-26 RX ORDER — AMLODIPINE AND BENAZEPRIL HYDROCHLORIDE 5; 10 MG/1; MG/1
1 CAPSULE ORAL DAILY
Qty: 90 CAP | Refills: 1 | Status: SHIPPED | OUTPATIENT
Start: 2021-01-26 | End: 2021-06-29 | Stop reason: SDUPTHER

## 2021-01-26 RX ORDER — LEVOTHYROXINE SODIUM 88 UG/1
88 TABLET ORAL
Qty: 90 TAB | Refills: 1 | Status: SHIPPED | OUTPATIENT
Start: 2021-01-26 | End: 2021-06-29 | Stop reason: SDUPTHER

## 2021-01-27 LAB — T3 SERPL-MCNC: 156 NG/DL (ref 71–180)

## 2021-03-28 DIAGNOSIS — R00.0 TACHYCARDIA: ICD-10-CM

## 2021-03-28 RX ORDER — LIOTHYRONINE SODIUM 5 UG/1
5 TABLET ORAL DAILY
Qty: 90 TAB | Refills: 0 | Status: CANCELLED | OUTPATIENT
Start: 2021-03-28

## 2021-03-28 RX ORDER — ATENOLOL 50 MG/1
50 TABLET ORAL DAILY
Qty: 90 TAB | Refills: 0 | Status: CANCELLED | OUTPATIENT
Start: 2021-03-28

## 2021-03-29 NOTE — TELEPHONE ENCOUNTER
Patient still has refills at the pharmacy for Tenormin and Cytomel. I contacted the pharmacy to confirm. No further action needed.

## 2021-05-28 DIAGNOSIS — R00.0 TACHYCARDIA: ICD-10-CM

## 2021-05-28 RX ORDER — LIOTHYRONINE SODIUM 5 UG/1
5 TABLET ORAL DAILY
Qty: 90 TABLET | Refills: 0 | Status: CANCELLED | OUTPATIENT
Start: 2021-05-28

## 2021-05-28 RX ORDER — ATENOLOL 50 MG/1
50 TABLET ORAL DAILY
Qty: 90 TABLET | Refills: 0 | Status: CANCELLED | OUTPATIENT
Start: 2021-05-28

## 2021-06-11 ENCOUNTER — VIRTUAL VISIT (OUTPATIENT)
Dept: FAMILY MEDICINE CLINIC | Age: 36
End: 2021-06-11
Payer: COMMERCIAL

## 2021-06-11 DIAGNOSIS — I10 ESSENTIAL HYPERTENSION: Primary | ICD-10-CM

## 2021-06-11 DIAGNOSIS — E06.3 HASHIMOTO'S DISEASE: ICD-10-CM

## 2021-06-11 DIAGNOSIS — E66.9 OBESITY, CLASS I, BMI 30-34.9: ICD-10-CM

## 2021-06-11 PROCEDURE — 99212 OFFICE O/P EST SF 10 MIN: CPT | Performed by: NURSE PRACTITIONER

## 2021-06-11 NOTE — PROGRESS NOTES
Pavel Almaraz is a 30 y.o. female who was seen by synchronous (real-time) audio-video technology on 6/11/2021 for No chief complaint on file. Assessment & Plan:   Diagnoses and all orders for this visit:    1. Essential hypertension  -     METABOLIC PANEL, COMPREHENSIVE; Future  -     LIPID PANEL; Future    2. Hashimoto's disease    3. Obesity, Class I, BMI 30-34.9  -     HEMOGLOBIN A1C WITH EAG; Future      Follow-up and Dispositions    · Return in about 4 months (around 10/11/2021) for HTN, in office follow up, fasting labs 1 wk prior. I spent at least 10 minutes on this visit with this established patient. 712  Subjective:   Patient states she does have an upcoming appt with endocrinology on 6/19/2021 with [facility] THE [facility] Endocrinology. Hypothyroidism ros: denies fatigue, weight changes, heat/cold intolerance, bowel/skin changes or CVS symptoms. Patient is compliant with all meds. Medication side effects none. Hypertension ROS: taking medications as instructed, no medication side effects noted, patient does not perform home BP monitoring, no TIA's, no chest pain on exertion, no dyspnea on exertion, no swelling of ankles. Patient states continues to exercise with [employer] fitness videos 5 days per week on her lunch break. Prior to Admission medications    Medication Sig Start Date End Date Taking? Authorizing Provider   atenoloL (TENORMIN) 50 mg tablet Take 1 Tablet by mouth daily. 6/3/21   Darshan BROWN NP   liothyronine (CYTOMEL) 5 mcg tablet Take 1 Tablet by mouth daily. 6/3/21   Darshan BROWN NP   levothyroxine (SYNTHROID) 88 mcg tablet Take 1 Tab by mouth every morning. 1/26/21   Darshan BROWN NP   amLODIPine-benazepril (LOTREL) 5-10 mg per capsule Take 1 Cap by mouth daily. 1/26/21   Vira Nix NP     There is no problem list on file for this patient. There are no problems to display for this patient.     Current Outpatient Medications   Medication Sig Dispense Refill    atenoloL (TENORMIN) 50 mg tablet Take 1 Tablet by mouth daily. 30 Tablet 0    liothyronine (CYTOMEL) 5 mcg tablet Take 1 Tablet by mouth daily. 30 Tablet 0    levothyroxine (SYNTHROID) 88 mcg tablet Take 1 Tab by mouth every morning. 90 Tab 1    amLODIPine-benazepril (LOTREL) 5-10 mg per capsule Take 1 Cap by mouth daily. 90 Cap 1     Allergies   Allergen Reactions    Latex Rash     Past Medical History:   Diagnosis Date    Hypertension     Tachycardia, unspecified     being evaluated by cardiologist.   [facility] Thyroid disease      Past Surgical History:   Procedure Laterality Date    HX TUBAL LIGATION       Family History   Problem Relation Age of Onset    Diabetes Mother     Heart Disease Mother     Hypertension Mother      Social History     Tobacco Use    Smoking status: Never Smoker    Smokeless tobacco: Never Used   Substance Use Topics    Alcohol use: Yes     Alcohol/week: 1.0 standard drinks     Types: 1 Standard drinks or equivalent per week       ROS    Objective:   No flowsheet data found. General: alert, cooperative, no distress   Mental  status: normal mood, behavior, speech, dress, motor activity, and thought processes, able to follow commands   HENT: NCAT   Neck: no visualized mass   Resp: no respiratory distress   Neuro: no gross deficits   Skin: no discoloration or lesions of concern on visible areas   Psychiatric: normal affect, consistent with stated mood, no evidence of hallucinations     Additional exam findings: We discussed the expected course, resolution and complications of the diagnosis(es) in detail. Medication risks, benefits, costs, interactions, and alternatives were discussed as indicated. I advised her to contact the office if her condition worsens, changes or fails to improve as anticipated. She expressed understanding with the diagnosis(es) and plan. Leandra Gregg, was evaluated through a synchronous (real-time) audio-video encounter.  The patient (or guardian if applicable) is aware that this is a billable service. Verbal consent to proceed has been obtained within the past 12 months. The visit was conducted pursuant to the emergency declaration under the [address] [place] [address] and the [employer] and [employer] General Act. Patient identification was verified, and a caregiver was present when appropriate. The patient was located in a state where the provider was credentialed to provide care.     Ashleigh Larsen NP

## 2021-10-11 ENCOUNTER — APPOINTMENT (OUTPATIENT)
Dept: FAMILY MEDICINE CLINIC | Age: 36
End: 2021-10-11

## 2021-10-11 ENCOUNTER — HOSPITAL ENCOUNTER (OUTPATIENT)
Dept: LAB | Age: 36
Discharge: HOME | End: 2021-10-11
Payer: COMMERCIAL

## 2021-10-11 DIAGNOSIS — I10 ESSENTIAL HYPERTENSION: ICD-10-CM

## 2021-10-11 DIAGNOSIS — E66.9 OBESITY, CLASS I, BMI 30-34.9: ICD-10-CM

## 2021-10-11 LAB
ALBUMIN SERPL-MCNC: 4 G/DL (ref 3.4–5)
ALBUMIN/GLOB SERPL: 1.1 {RATIO} (ref 0.8–1.7)
ALP SERPL-CCNC: 84 U/L (ref 45–117)
ALT SERPL-CCNC: 29 U/L (ref 13–56)
ANION GAP SERPL CALC-SCNC: 2 MMOL/L (ref 3–18)
AST SERPL-CCNC: 12 U/L (ref 10–38)
BILIRUB SERPL-MCNC: 0.3 MG/DL (ref 0.2–1)
BUN SERPL-MCNC: 10 MG/DL (ref 7–18)
BUN/CREAT SERPL: 14 (ref 12–20)
CALCIUM SERPL-MCNC: 9 MG/DL (ref 8.5–10.1)
CHLORIDE SERPL-SCNC: 110 MMOL/L (ref 100–111)
CHOLEST SERPL-MCNC: 177 MG/DL
CO2 SERPL-SCNC: 29 MMOL/L (ref 21–32)
CREAT SERPL-MCNC: 0.72 MG/DL (ref 0.6–1.3)
EST. AVERAGE GLUCOSE BLD GHB EST-MCNC: 94 MG/DL
GLOBULIN SER CALC-MCNC: 3.7 G/DL (ref 2–4)
GLUCOSE SERPL-MCNC: 109 MG/DL (ref 74–99)
HBA1C MFR BLD: 4.9 % (ref 4.2–5.6)
HDLC SERPL-MCNC: 27 MG/DL (ref 40–60)
HDLC SERPL: 6.6 {RATIO} (ref 0–5)
LDLC SERPL CALC-MCNC: 72.2 MG/DL (ref 0–100)
LIPID PROFILE,FLP: ABNORMAL
POTASSIUM SERPL-SCNC: 4.5 MMOL/L (ref 3.5–5.5)
PROT SERPL-MCNC: 7.7 G/DL (ref 6.4–8.2)
SODIUM SERPL-SCNC: 141 MMOL/L (ref 136–145)
TRIGL SERPL-MCNC: 389 MG/DL (ref ?–150)
VLDLC SERPL CALC-MCNC: 77.8 MG/DL

## 2021-10-11 PROCEDURE — 83036 HEMOGLOBIN GLYCOSYLATED A1C: CPT

## 2021-10-11 PROCEDURE — 36415 COLL VENOUS BLD VENIPUNCTURE: CPT

## 2021-10-11 PROCEDURE — 80053 COMPREHEN METABOLIC PANEL: CPT

## 2021-10-11 PROCEDURE — 80061 LIPID PANEL: CPT

## 2021-11-19 ENCOUNTER — OFFICE VISIT (OUTPATIENT)
Dept: FAMILY MEDICINE CLINIC | Age: 36
End: 2021-11-19
Payer: COMMERCIAL

## 2021-11-19 VITALS
OXYGEN SATURATION: 98 % | BODY MASS INDEX: 33.46 KG/M2 | SYSTOLIC BLOOD PRESSURE: 128 MMHG | TEMPERATURE: 97.1 F | RESPIRATION RATE: 20 BRPM | HEIGHT: 64 IN | WEIGHT: 196 LBS | HEART RATE: 80 BPM | DIASTOLIC BLOOD PRESSURE: 76 MMHG

## 2021-11-19 DIAGNOSIS — R00.0 TACHYCARDIA: ICD-10-CM

## 2021-11-19 DIAGNOSIS — I10 ESSENTIAL HYPERTENSION: Primary | ICD-10-CM

## 2021-11-19 DIAGNOSIS — Z23 ENCOUNTER FOR IMMUNIZATION: ICD-10-CM

## 2021-11-19 DIAGNOSIS — E06.3 HASHIMOTO'S DISEASE: ICD-10-CM

## 2021-11-19 PROCEDURE — 90471 IMMUNIZATION ADMIN: CPT | Performed by: NURSE PRACTITIONER

## 2021-11-19 PROCEDURE — 99214 OFFICE O/P EST MOD 30 MIN: CPT | Performed by: NURSE PRACTITIONER

## 2021-11-19 PROCEDURE — 90686 IIV4 VACC NO PRSV 0.5 ML IM: CPT | Performed by: NURSE PRACTITIONER

## 2021-11-19 NOTE — PROGRESS NOTES
No chief complaint on file. 1. \"Have you been to the ER, urgent care clinic since your last visit? Hospitalized since your last visit? \" No    2. \"Have you seen or consulted any other health care providers outside of the [address] [place] [address] since your last visit? \" No     3. For patients aged 39-70: Has the patient had a colonoscopy? No     If the patient is female:    4. For patients aged 41-77: Has the patient had a mammogram within the past 2 years? No    5. For patients aged 21-65: Has the patient had a pap smear? No       Learning Assessment 7/16/2019   PRIMARY LEARNER Patient   HIGHEST LEVEL OF EDUCATION - PRIMARY LEARNER  SOME COLLEGE   BARRIERS PRIMARY LEARNER NONE   PRIMARY LANGUAGE ENGLISH   LEARNER PREFERENCE PRIMARY READING   ANSWERED BY self   RELATIONSHIP SELF     3 most recent PHQ Screens 11/19/2021   Little interest or pleasure in doing things Not at all   Feeling down, depressed, irritable, or hopeless Not at all   Total Score PHQ 2 0   Trouble falling or staying asleep, or sleeping too much -   Feeling tired or having little energy -   Poor appetite, weight loss, or overeating -   Feeling bad about yourself - or that you are a failure or have let yourself or your family down -   Trouble concentrating on things such as school, work, reading, or watching TV -   Moving or speaking so slowly that other people could have noticed; or the opposite being so fidgety that others notice -   Thoughts of being better off dead, or hurting yourself in some way -   PHQ 9 Score -   How difficult have these problems made it for you to do your work, take care of your home and get along with others -     No flowsheet data found. No flowsheet data found.       Okay to discontinue medications per verbal order Josiah NIELSENC

## 2021-11-19 NOTE — PROGRESS NOTES
Jimenez Garcia is a 30 y.o. female who was seen in clinic today (11/19/2021) for No chief complaint on file. Assessment & Plan:   Diagnoses and all orders for this visit:    1. Essential hypertension    2. Hashimoto's disease    3. Tachycardia    4. Encounter for immunization  -     INFLUENZA VIRUS VAC QUAD,SPLIT,PRESV FREE SYRINGE IM  -     THER/PROPH/DIAG INJECTION, SUBCUT/IM      Discussed decreasing wine consumption due to elevated triglyceride levels. I have discussed the diagnosis with the patient and the intended plan as seen in the above orders. The patient has received an after-visit summary and questions were answered concerning future plans. I have discussed medication side effects and warnings with the patient as well. Patient agreeable with above plan and verbalizes understanding. Follow-up and Dispositions    · Return in about 6 months (around 5/19/2022) for HTN/HLD, in office follow up, fasting labs 1 wk prior. Subjective:   Hypertension ROS: taking medications as instructed, no medication side effects noted, no TIA's, no chest pain on exertion, no dyspnea on exertion, no swelling of ankles. Discussed most recent labs. Patient states she does drink wine every other day during the week and more than 1 glass of wine on the weekends. Lab Results   Component Value Date/Time    Sodium 141 10/11/2021 01:06 PM    Potassium 4.5 10/11/2021 01:06 PM    Chloride 110 10/11/2021 01:06 PM    CO2 29 10/11/2021 01:06 PM    Anion gap 2 (L) 10/11/2021 01:06 PM    Glucose 109 (H) 10/11/2021 01:06 PM    BUN 10 10/11/2021 01:06 PM    Creatinine 0.72 10/11/2021 01:06 PM    BUN/Creatinine ratio 14 10/11/2021 01:06 PM    GFR est AA >60 10/11/2021 01:06 PM    GFR est non-AA >60 10/11/2021 01:06 PM    Calcium 9.0 10/11/2021 01:06 PM    Bilirubin, total 0.3 10/11/2021 01:06 PM    Alk.  phosphatase 84 10/11/2021 01:06 PM    Protein, total 7.7 10/11/2021 01:06 PM    Albumin 4.0 10/11/2021 01:06 PM    Globulin 3.7 10/11/2021 01:06 PM    A-G Ratio 1.1 10/11/2021 01:06 PM    ALT (SGPT) 29 10/11/2021 01:06 PM    AST (SGOT) 12 10/11/2021 01:06 PM     Lab Results   Component Value Date/Time    Cholesterol, total 177 10/11/2021 01:06 PM    HDL Cholesterol 27 (L) 10/11/2021 01:06 PM    LDL, calculated 72.2 10/11/2021 01:06 PM    VLDL, calculated 77.8 10/11/2021 01:06 PM    Triglyceride 389 (H) 10/11/2021 01:06 PM    CHOL/HDL Ratio 6.6 (H) 10/11/2021 01:06 PM     Lab Results   Component Value Date/Time    Hemoglobin A1c 4.9 10/11/2021 01:06 PM     Lab Results   Component Value Date/Time    WBC 12.9 12/30/2012 10:38 AM    HGB 13.2 12/30/2012 10:38 AM    HCT 37.8 12/30/2012 10:38 AM    PLATELET 958 29/94/3235 10:38 AM    MCV 89.4 12/30/2012 10:38 AM       Wt Readings from Last 3 Encounters:   11/19/21 196 lb (88.9 kg)   08/13/19 174 lb 12.8 oz (79.3 kg)   07/16/19 176 lb (79.8 kg)     Temp Readings from Last 3 Encounters:   11/19/21 97.1 °F (36.2 °C) (Temporal)   08/13/19 98.3 °F (36.8 °C) (Oral)   07/16/19 98.4 °F (36.9 °C) (Oral)     BP Readings from Last 3 Encounters:   11/19/21 128/76   08/13/19 113/77   07/16/19 127/84     Pulse Readings from Last 3 Encounters:   11/19/21 80   08/13/19 70   07/16/19 (!) 121     Prior to Admission medications    Medication Sig Start Date End Date Taking? Authorizing Provider   levothyroxine (SYNTHROID) 88 mcg tablet Take 1 Tablet by mouth every morning. 7/8/21   Kane Mahan T, NP   amLODIPine-benazepril (LOTREL) 5-10 mg per capsule Take 1 Capsule by mouth daily. 7/8/21   Kane BROWN NP   atenoloL (TENORMIN) 50 mg tablet TAKE ONE TABLET BY MOUTH DAILY 7/2/21   Kane BROWN NP   liothyronine (CYTOMEL) 5 mcg tablet TAKE ONE TABLET BY MOUTH DAILY  Patient not taking: Reported on 11/19/2021 7/2/21 11/19/21  Neal Councilman, NP     The following sections were reviewed & updated as appropriate: PMH, PSH, FH, and SH.     Review of Systems   Constitutional: Negative for activity change, appetite change, chills, fatigue and fever. Respiratory: Negative for chest tightness and shortness of breath. Cardiovascular: Negative for chest pain. Neurological: Negative for dizziness and headaches. Objective:     Visit Vitals  /76 (BP 1 Location: Left arm, BP Patient Position: Sitting, BP Cuff Size: Adult)   Pulse 80   Temp 97.1 °F (36.2 °C) (Temporal)   Resp 20   Ht 5' 4\" (1.626 m)   Wt 196 lb (88.9 kg)   SpO2 98%   BMI 33.64 kg/m²      Physical Exam  Constitutional:       General: She is not in acute distress. Appearance: She is well-developed. HENT:      Head: Normocephalic and atraumatic. Neck:      Vascular: No carotid bruit. Cardiovascular:      Rate and Rhythm: Normal rate and regular rhythm. Heart sounds: Normal heart sounds. No murmur heard. No friction rub. No gallop. Pulmonary:      Effort: Pulmonary effort is normal.      Breath sounds: Normal breath sounds. No decreased breath sounds, wheezing, rhonchi or rales. Abdominal:      General: Bowel sounds are normal.      Palpations: Abdomen is soft. Tenderness: There is no abdominal tenderness. Musculoskeletal:      Cervical back: Normal range of motion and neck supple. Lymphadenopathy:      Cervical: No cervical adenopathy. Skin:     General: Skin is warm and dry. Neurological:      Mental Status: She is alert and oriented to person, place, and time. Disclaimer: The patient understands our medical plan. Alternatives have been explained and offered. The risks, benefits and significant side effects of all medications have been reviewed. Anticipated time course and progression of condition reviewed. All questions have been addressed. She is encouraged to employ the information provided in the after visit summary, which was reviewed.       Where applicable, she is instructed to call the clinic if she has not been notified either by phone or through [address] with the results of her tests or with an appointment plan for any referrals within 1 week(s). No news is not good news; it's no news. The patient  is to call if her condition worsens or fails to improve or if significant side effects are experienced. Aspects of this note may have been generated using voice recognition software. Despite editing, there may be unrecognized errors.        Rosaline Osler, NP

## 2021-11-19 NOTE — PATIENT INSTRUCTIONS
Begin taking omega-3 fatty acid ethyl esters (EPA and DHA) 4 grams daily  Learning About High Triglycerides  What are high triglycerides? Triglycerides are a type of fat in your blood. Your body uses them for energy. You need some for good health. But high triglyceride levels are linked with a higher risk of coronary artery disease. A high level may be a sign of metabolic syndrome. Very high levels raise your risk of pancreatitis. What causes them? High triglycerides can run in families. They may also be caused by other conditions, like obesity and diabetes. You may have high levels of this fat if you eat or drink too many foods or drinks with added sugar or if you drink a lot of alcohol. And some medicines can cause this condition. What are their symptoms? High triglycerides usually don't cause symptoms. But if the condition is genetic, you may see fatty bumps under your skin. How are they diagnosed? A blood test is used to measure triglycerides. It's most accurate if it's done after you go without food or drink for 9 to 14 hours (fasting). Triglyceride levels are:  · Normal when they are less than 150 mg/dL. · Moderately high when they are 150 to 499 mg/dL. · Very high when they are 500 mg/dL or higher. How are they treated? A healthy lifestyle can help lower your triglycerides and your risk of coronary artery disease. It includes losing weight, being active, limiting high-sugar foods and drinks, and limiting alcohol. Your doctor may recommend that you also take medicine. Your doctor will treat other health problems if they are causing high levels. How do you care for yourself when you have high triglycerides? A healthy diet and lifestyle can help lower your triglycerides level and lower your risk of coronary artery disease. · Lose weight, and stay at a healthy weight. Triglycerides are stored as fat in your tissues and muscles. · Limit foods and drinks that have a lot of sugar.    These include sugar-sweetened desserts, soda pop, and fruit juice. · Limit saturated fats. These are found in animal-based foods like meat, butter, milk, and cheese. They are also found in coconut oil, palm oil, and cocoa butter. · Choose a heart-healthy eating plan. Eat a diet that's rich in vegetables, whole grains, fish, lean meats, and low-fat or nonfat dairy foods. Eating oily fish may lower your levels. These fish include salmon, mackerel, lake trout, herring, and sardines. · Limit or avoid alcohol. Limit alcohol to 2 drinks a day for men and 1 drink a day for women. Alcohol has a strong effect on triglycerides. · Be active on most days of the week. Before you start to be more active, check with your doctor to be sure it's safe. Try to do moderate activity at least 2½ hours a week. Or try to do vigorous activity at least 1¼ hours a week. · If you have diabetes, keep your blood sugar in your target range. · Don't smoke. If you need help quitting, talk to your doctor about stop-smoking programs and medicines. These can increase your chances of quitting for good. · Take your medicines exactly as prescribed. Call your doctor if you think you are having a problem with your medicine. Where can you learn more? Go to http://www.gray.com/  Enter T123 in the search box to learn more about \"Learning About High Triglycerides. \"  Current as of: April 29, 2021               Content Version: 13.0  © 2006-2021 [employer]. Care instructions adapted under license by [employer] (which disclaims liability or warranty for this information). If you have questions about a medical condition or this instruction, always ask your healthcare professional. [place] [address] any warranty or liability for your use of this information.

## 2021-12-30 DIAGNOSIS — R00.0 TACHYCARDIA: ICD-10-CM

## 2021-12-30 RX ORDER — ATENOLOL 50 MG/1
50 TABLET ORAL DAILY
Qty: 90 TABLET | Refills: 1 | Status: CANCELLED | OUTPATIENT
Start: 2021-12-30

## 2021-12-30 NOTE — TELEPHONE ENCOUNTER
Last Visit: 11/19/21 with NP Oneal Ta  Next Appointment: Advised to follow-up in 6 months  Previous Refill Encounter(s): 7/2/21 #90 with 1 refill    Requested Prescriptions     Pending Prescriptions Disp Refills    atenoloL (TENORMIN) 50 mg tablet [Pharmacy Med Name: ATENOLOL 50 MG TABLET] 90 Tablet 1     Sig: TAKE ONE TABLET BY MOUTH DAILY

## 2022-01-10 RX ORDER — ATENOLOL 50 MG/1
TABLET ORAL
Qty: 90 TABLET | Refills: 1 | Status: SHIPPED | OUTPATIENT
Start: 2022-01-10 | End: 2022-04-27 | Stop reason: SDUPTHER

## 2022-04-27 DIAGNOSIS — I10 ESSENTIAL HYPERTENSION: ICD-10-CM

## 2022-04-27 DIAGNOSIS — R00.0 TACHYCARDIA: ICD-10-CM

## 2022-04-27 NOTE — TELEPHONE ENCOUNTER
Last Visit: 11/19/21 with SMITHA Wilson  Next Appointment: 5/10/22 with SMIHTA Wilson  Previous Refill Encounter(s): 7/8/21 Synthroid & Lotrel #90 with 2 refills, 1/10/22 Tenormin #90 with 1 refill    Requested Prescriptions     Pending Prescriptions Disp Refills    levothyroxine (SYNTHROID) 88 mcg tablet 90 Tablet 1     Sig: Take 1 Tablet by mouth every morning.  amLODIPine-benazepril (LOTREL) 5-10 mg per capsule 90 Capsule 1     Sig: Take 1 Capsule by mouth daily.  atenoloL (TENORMIN) 50 mg tablet 90 Tablet 1     Sig: Take 1 Tablet by mouth daily.

## 2022-05-06 RX ORDER — LEVOTHYROXINE SODIUM 88 UG/1
88 TABLET ORAL
Qty: 90 TABLET | Refills: 1 | Status: SHIPPED | OUTPATIENT
Start: 2022-05-06 | End: 2022-10-24 | Stop reason: SDUPTHER

## 2022-05-06 RX ORDER — ATENOLOL 50 MG/1
50 TABLET ORAL DAILY
Qty: 90 TABLET | Refills: 1 | Status: SHIPPED | OUTPATIENT
Start: 2022-05-06 | End: 2022-10-24 | Stop reason: SDUPTHER

## 2022-05-06 RX ORDER — AMLODIPINE AND BENAZEPRIL HYDROCHLORIDE 5; 10 MG/1; MG/1
1 CAPSULE ORAL DAILY
Qty: 90 CAPSULE | Refills: 1 | Status: SHIPPED | OUTPATIENT
Start: 2022-05-06 | End: 2022-10-24 | Stop reason: SDUPTHER

## 2022-05-10 ENCOUNTER — VIRTUAL VISIT (OUTPATIENT)
Dept: FAMILY MEDICINE CLINIC | Age: 37
End: 2022-05-10

## 2022-05-10 ENCOUNTER — TELEPHONE (OUTPATIENT)
Dept: FAMILY MEDICINE CLINIC | Age: 37
End: 2022-05-10

## 2022-05-10 DIAGNOSIS — E06.3 HASHIMOTO'S DISEASE: ICD-10-CM

## 2022-05-10 DIAGNOSIS — I10 ESSENTIAL HYPERTENSION: ICD-10-CM

## 2022-05-10 DIAGNOSIS — R00.0 TACHYCARDIA: ICD-10-CM

## 2022-05-10 RX ORDER — BISMUTH SUBSALICYLATE 262 MG
1 TABLET,CHEWABLE ORAL DAILY
COMMUNITY

## 2022-05-10 NOTE — PROGRESS NOTES
Sravani Parisi (: 1985) is a 29 y.o. female, established patient, here for evaluation of the following chief complaint(s):   Hypertension and Cholesterol Problem           Sravani Parisi, was evaluated through a synchronous (real-time) audio-video encounter. The patient (or guardian if applicable) is aware that this is a billable service, which includes applicable co-pays. Verbal consent to proceed has been obtained. The visit was conducted pursuant to the emergency declaration under the [address] [place] [address], [address] [place] [address] authority and the [employer] and "[employer] General Act. Patient identification was verified, and a caregiver was present when appropriate. The patient was located at home in a state where the provider was licensed to provide care. An electronic signature was used to authenticate this note.   -- Augustina Triplett

## 2022-05-10 NOTE — PROGRESS NOTES
Patient due for an in office appt with labs 1 week prior. Lab orders have been placed and patient will be rescheduled for an in office visit.

## 2022-05-20 ENCOUNTER — HOSPITAL ENCOUNTER (OUTPATIENT)
Dept: LAB | Age: 37
Discharge: HOME | End: 2022-05-20
Payer: COMMERCIAL

## 2022-05-20 ENCOUNTER — APPOINTMENT (OUTPATIENT)
Dept: FAMILY MEDICINE CLINIC | Age: 37
End: 2022-05-20

## 2022-05-20 DIAGNOSIS — E06.3 HASHIMOTO'S DISEASE: ICD-10-CM

## 2022-05-20 DIAGNOSIS — I10 ESSENTIAL HYPERTENSION: ICD-10-CM

## 2022-05-20 LAB
ALBUMIN SERPL-MCNC: 4.3 G/DL (ref 3.4–5)
ALBUMIN/GLOB SERPL: 1.2 {RATIO} (ref 0.8–1.7)
ALP SERPL-CCNC: 81 U/L (ref 45–117)
ALT SERPL-CCNC: 41 U/L (ref 13–56)
ANION GAP SERPL CALC-SCNC: 10 MMOL/L (ref 3–18)
AST SERPL-CCNC: 33 U/L (ref 10–38)
BILIRUB SERPL-MCNC: 0.5 MG/DL (ref 0.2–1)
BUN SERPL-MCNC: 12 MG/DL (ref 7–18)
BUN/CREAT SERPL: 18 (ref 12–20)
CALCIUM SERPL-MCNC: 9.5 MG/DL (ref 8.5–10.1)
CHLORIDE SERPL-SCNC: 105 MMOL/L (ref 100–111)
CHOLEST SERPL-MCNC: 173 MG/DL
CO2 SERPL-SCNC: 23 MMOL/L (ref 21–32)
CREAT SERPL-MCNC: 0.65 MG/DL (ref 0.6–1.3)
GLOBULIN SER CALC-MCNC: 3.6 G/DL (ref 2–4)
GLUCOSE SERPL-MCNC: 90 MG/DL (ref 74–99)
HDLC SERPL-MCNC: 33 MG/DL (ref 40–60)
HDLC SERPL: 5.2 {RATIO} (ref 0–5)
LDLC SERPL CALC-MCNC: 62.2 MG/DL (ref 0–100)
LIPID PROFILE,FLP: ABNORMAL
POTASSIUM SERPL-SCNC: 4.4 MMOL/L (ref 3.5–5.5)
PROT SERPL-MCNC: 7.9 G/DL (ref 6.4–8.2)
SODIUM SERPL-SCNC: 138 MMOL/L (ref 136–145)
T4 FREE SERPL-MCNC: 1 NG/DL (ref 0.7–1.5)
TRIGL SERPL-MCNC: 389 MG/DL (ref ?–150)
TSH SERPL DL<=0.05 MIU/L-ACNC: 1.93 UIU/ML (ref 0.36–3.74)
VLDLC SERPL CALC-MCNC: 77.8 MG/DL

## 2022-05-20 PROCEDURE — 84443 ASSAY THYROID STIM HORMONE: CPT

## 2022-05-20 PROCEDURE — 36415 COLL VENOUS BLD VENIPUNCTURE: CPT

## 2022-05-20 PROCEDURE — 84439 ASSAY OF FREE THYROXINE: CPT

## 2022-05-20 PROCEDURE — 80053 COMPREHEN METABOLIC PANEL: CPT

## 2022-05-20 PROCEDURE — 80061 LIPID PANEL: CPT

## 2022-06-28 ENCOUNTER — OFFICE VISIT (OUTPATIENT)
Dept: FAMILY MEDICINE CLINIC | Age: 37
End: 2022-06-28
Payer: COMMERCIAL

## 2022-06-28 VITALS
RESPIRATION RATE: 16 BRPM | HEIGHT: 64 IN | HEART RATE: 100 BPM | DIASTOLIC BLOOD PRESSURE: 80 MMHG | OXYGEN SATURATION: 97 % | WEIGHT: 206.4 LBS | SYSTOLIC BLOOD PRESSURE: 132 MMHG | TEMPERATURE: 98.4 F | BODY MASS INDEX: 35.24 KG/M2

## 2022-06-28 DIAGNOSIS — I10 ESSENTIAL HYPERTENSION: Primary | ICD-10-CM

## 2022-06-28 DIAGNOSIS — E78.1 PURE HYPERTRIGLYCERIDEMIA: ICD-10-CM

## 2022-06-28 PROCEDURE — 99213 OFFICE O/P EST LOW 20 MIN: CPT | Performed by: NURSE PRACTITIONER

## 2022-06-28 RX ORDER — OMEGA-3-ACID ETHYL ESTERS 1 G/1
2 CAPSULE, LIQUID FILLED ORAL 2 TIMES DAILY
Qty: 180 CAPSULE | Refills: 1 | Status: SHIPPED | OUTPATIENT
Start: 2022-06-28 | End: 2022-10-01

## 2022-06-28 NOTE — PROGRESS NOTES
1. \"Have you been to the ER, urgent care clinic since your last visit? Hospitalized since your last visit? \" No    2. \"Have you seen or consulted any other health care providers outside of the [address] [place] [address] since your last visit? \" Yes, Endocrinologist    3. For patients aged 39-70: Has the patient had a colonoscopy / FIT/ Cologuard? NA - based on age      If the patient is female:    4. For patients aged 41-77: Has the patient had a mammogram within the past 2 years? Yes - no Care Gap present      5. For patients aged 21-65: Has the patient had a pap smear?  No

## 2022-06-28 NOTE — PATIENT INSTRUCTIONS
Learning About High Triglycerides  What are high triglycerides? Triglycerides are a type of fat in your blood. Your body uses them for energy. You need some for good health. But high triglyceride levels are linked with a higher risk of coronary artery disease. A high level may be a sign of metabolic syndrome. Very high levels raise your risk of pancreatitis. What causes them? High triglycerides can run in families. They may also be caused by other conditions, like obesity and diabetes. You may have high levels of this fat if you eat or drink too many foods or drinks with added sugar or if you drink a lot of alcohol. And some medicines can cause this condition. What are their symptoms? High triglycerides usually don't cause symptoms. But if the condition is genetic, you may see fatty bumps under your skin. How are they diagnosed? A blood test is used to measure triglycerides. It's most accurate if it's done after you go without food or drink for 9 to 14 hours (fasting). Triglyceride levels are:  · Normal when they are less than 150 mg/dL. · Moderately high when they are 150 to 499 mg/dL. · Very high when they are 500 mg/dL or higher. How are they treated? A healthy lifestyle can help lower your triglycerides and your risk of coronary artery disease. It includes losing weight, being active, limiting high-sugar foods and drinks, and limiting alcohol. Your doctor may recommend that you also take medicine. Your doctor will treat other health problems if they are causing high levels. How do you care for yourself when you have high triglycerides? A healthy diet and lifestyle can help lower your triglycerides level and lower your risk of coronary artery disease. · Lose weight, and stay at a healthy weight. Triglycerides are stored as fat in your tissues and muscles. · Limit foods and drinks that have a lot of sugar. These include sugar-sweetened desserts, soda pop, and fruit juice.   · Limit saturated fats. These are found in animal-based foods like meat, butter, milk, and cheese. They are also found in coconut oil, palm oil, and cocoa butter. · Choose a heart-healthy eating plan. Eat a diet that's rich in vegetables, whole grains, fish, lean meats, and low-fat or nonfat dairy foods. Eating oily fish may lower your levels. These fish include salmon, mackerel, lake trout, herring, and sardines. · Limit or avoid alcohol. Limit alcohol to 2 drinks a day for men and 1 drink a day for women. Alcohol has a strong effect on triglycerides. · Be active on most days of the week. Before you start to be more active, check with your doctor to be sure it's safe. Try to do moderate activity at least 2½ hours a week. Or try to do vigorous activity at least 1¼ hours a week. · If you have diabetes, keep your blood sugar in your target range. · Don't smoke. If you need help quitting, talk to your doctor about stop-smoking programs and medicines. These can increase your chances of quitting for good. · Take your medicines exactly as prescribed. Call your doctor if you think you are having a problem with your medicine. Where can you learn more? Go to http://www.gray.com/  Enter T123 in the search box to learn more about \"Learning About High Triglycerides. \"  Current as of: January 10, 2022               Content Version: 13.2  © 2006-2022 [employer]wise, Inc[employer]. Care instructions adapted under license by [employer] (which disclaims liability or warranty for this information). If you have questions about a medical condition or this instruction, always ask your healthcare professional. [place] [address] any warranty or liability for your use of this information.

## 2022-06-28 NOTE — PROGRESS NOTES
Cari Mehta is a 29 y.o. female who was seen in clinic today (2022) for Hypertension    Assessment & Plan:   Diagnoses and all orders for this visit:    1. Essential hypertension    2. Pure hypertriglyceridemia    Other orders  -     omega-3 acid ethyl esters (LOVAZA) 1 gram capsule; Take 2 Capsules by mouth two (2) times a day. Isabel Patel as above for elevated triglyceride levels  Advised to decrease alcohol intake  I have discussed the diagnosis with the patient and the intended plan as seen in the above orders. The patient has received an after-visit summary and questions were answered concerning future plans. I have discussed medication side effects and warnings with the patient as well. Patient agreeable with above plan and verbalizes understanding. Follow-up and Dispositions    · Return in about 3 months (around 2022) for labs only/HTN in office in 6 months. Subjective:   Hypertension ROS: taking medications as instructed, no medication side effects noted, no TIA's, no chest pain on exertion, no dyspnea on exertion, no swelling of ankles. Patient reports she is unsure if she has a family history of elevated cholesterol. She does drink wine (on the weekends 4-5 glasses Fri/Sat) and eats starches. Patient reports she has been taking fish oil as instructed however, only once daily. Lab Results   Component Value Date/Time    Sodium 138 2022 08:20 AM    Potassium 4.4 2022 08:20 AM    Chloride 105 2022 08:20 AM    CO2 23 2022 08:20 AM    Anion gap 10 2022 08:20 AM    Glucose 90 2022 08:20 AM    BUN 12 2022 08:20 AM    Creatinine 0.65 2022 08:20 AM    BUN/Creatinine ratio 18 2022 08:20 AM    GFR est AA >60 2022 08:20 AM    GFR est non-AA >60 2022 08:20 AM    Calcium 9.5 2022 08:20 AM    Bilirubin, total 0.5 2022 08:20 AM    Alk.  phosphatase 81 2022 08:20 AM    Protein, total 7.9 2022 08:20 AM Albumin 4.3 05/20/2022 08:20 AM    Globulin 3.6 05/20/2022 08:20 AM    A-G Ratio 1.2 05/20/2022 08:20 AM    ALT (SGPT) 41 05/20/2022 08:20 AM    AST (SGOT) 33 05/20/2022 08:20 AM     Lab Results   Component Value Date/Time    Cholesterol, total 173 05/20/2022 08:20 AM    HDL Cholesterol 33 (L) 05/20/2022 08:20 AM    LDL, calculated 62.2 05/20/2022 08:20 AM    VLDL, calculated 77.8 05/20/2022 08:20 AM    Triglyceride 389 (H) 05/20/2022 08:20 AM    CHOL/HDL Ratio 5.2 (H) 05/20/2022 08:20 AM     Lab Results   Component Value Date/Time    Hemoglobin A1c 4.9 10/11/2021 01:06 PM     No results found for: [id number]    Lab Results   Component Value Date/Time    WBC 12.9 12/30/2012 10:38 AM    HGB 13.2 12/30/2012 10:38 AM    HCT 37.8 12/30/2012 10:38 AM    PLATELET 152 49/74/2337 10:38 AM    MCV 89.4 12/30/2012 10:38 AM       Wt Readings from Last 3 Encounters:   11/19/21 196 lb (88.9 kg)   08/13/19 174 lb 12.8 oz (79.3 kg)   07/16/19 176 lb (79.8 kg)     Temp Readings from Last 3 Encounters:   11/19/21 97.1 °F (36.2 °C) (Temporal)   08/13/19 98.3 °F (36.8 °C) (Oral)   07/16/19 98.4 °F (36.9 °C) (Oral)     BP Readings from Last 3 Encounters:   11/19/21 128/76   08/13/19 113/77   07/16/19 127/84     Pulse Readings from Last 3 Encounters:   11/19/21 80   08/13/19 70   07/16/19 (!) 121       Prior to Admission medications    Medication Sig Start Date End Date Taking? Authorizing Provider   multivitamin (ONE A DAY) tablet Take 1 Tablet by mouth daily. Provider, Historical   levothyroxine (SYNTHROID) 88 mcg tablet Take 1 Tablet by mouth every morning. 5/6/22   Juan Ramon BROWN, NP   amLODIPine-benazepril (LOTREL) 5-10 mg per capsule Take 1 Capsule by mouth daily. 5/6/22   Juan Ramon BROWN NP   atenoloL (TENORMIN) 50 mg tablet Take 1 Tablet by mouth daily. 5/6/22   Hannah Fournier NP         The following sections were reviewed & updated as appropriate: PMH, PSH, FH, and SH.       Review of Systems       Objective: There were no vitals taken for this visit. Physical Exam      Disclaimer: The patient understands our medical plan. Alternatives have been explained and offered. The risks, benefits and significant side effects of all medications have been reviewed. Anticipated time course and progression of condition reviewed. All questions have been addressed. She is encouraged to employ the information provided in the after visit summary, which was reviewed. Where applicable, she is instructed to call the clinic if she has not been notified either by phone or through [address] with the results of her tests or with an appointment plan for any referrals within 1 week(s). No news is not good news; it's no news. The patient  is to call if her condition worsens or fails to improve or if significant side effects are experienced. Aspects of this note may have been generated using voice recognition software. Despite editing, there may be unrecognized errors.        Caterina Navarrete NP

## 2022-07-18 DIAGNOSIS — I10 ESSENTIAL HYPERTENSION: Primary | ICD-10-CM

## 2022-07-18 DIAGNOSIS — E78.1 PURE HYPERTRIGLYCERIDEMIA: ICD-10-CM

## 2022-07-18 DIAGNOSIS — E06.3 HASHIMOTO'S DISEASE: ICD-10-CM

## 2022-09-28 ENCOUNTER — APPOINTMENT (OUTPATIENT)
Dept: FAMILY MEDICINE CLINIC | Age: 37
End: 2022-09-28

## 2022-09-28 ENCOUNTER — HOSPITAL ENCOUNTER (OUTPATIENT)
Dept: LAB | Age: 37
Discharge: HOME | End: 2022-09-28
Payer: COMMERCIAL

## 2022-09-28 DIAGNOSIS — E06.3 HASHIMOTO'S DISEASE: ICD-10-CM

## 2022-09-28 DIAGNOSIS — E78.1 PURE HYPERTRIGLYCERIDEMIA: ICD-10-CM

## 2022-09-28 DIAGNOSIS — I10 ESSENTIAL HYPERTENSION: ICD-10-CM

## 2022-09-28 LAB
ALBUMIN SERPL-MCNC: 4.3 G/DL (ref 3.4–5)
ALBUMIN/GLOB SERPL: 1.2 {RATIO} (ref 0.8–1.7)
ALP SERPL-CCNC: 78 U/L (ref 45–117)
ALT SERPL-CCNC: 38 U/L (ref 13–56)
ANION GAP SERPL CALC-SCNC: 9 MMOL/L (ref 3–18)
AST SERPL-CCNC: 22 U/L (ref 10–38)
BILIRUB SERPL-MCNC: 0.5 MG/DL (ref 0.2–1)
BUN SERPL-MCNC: 13 MG/DL (ref 7–18)
BUN/CREAT SERPL: 21 (ref 12–20)
CALCIUM SERPL-MCNC: 9.4 MG/DL (ref 8.5–10.1)
CHLORIDE SERPL-SCNC: 106 MMOL/L (ref 100–111)
CHOLEST SERPL-MCNC: 183 MG/DL
CO2 SERPL-SCNC: 24 MMOL/L (ref 21–32)
CREAT SERPL-MCNC: 0.62 MG/DL (ref 0.6–1.3)
GLOBULIN SER CALC-MCNC: 3.6 G/DL (ref 2–4)
GLUCOSE SERPL-MCNC: 93 MG/DL (ref 74–99)
HDLC SERPL-MCNC: 30 MG/DL (ref 40–60)
HDLC SERPL: 6.1 {RATIO} (ref 0–5)
LDLC SERPL CALC-MCNC: 103.2 MG/DL (ref 0–100)
LIPID PROFILE,FLP: ABNORMAL
POTASSIUM SERPL-SCNC: 4.4 MMOL/L (ref 3.5–5.5)
PROT SERPL-MCNC: 7.9 G/DL (ref 6.4–8.2)
SODIUM SERPL-SCNC: 139 MMOL/L (ref 136–145)
T4 FREE SERPL-MCNC: 1 NG/DL (ref 0.7–1.5)
TRIGL SERPL-MCNC: 249 MG/DL (ref ?–150)
TSH SERPL DL<=0.05 MIU/L-ACNC: 1.36 UIU/ML (ref 0.36–3.74)
VLDLC SERPL CALC-MCNC: 49.8 MG/DL

## 2022-09-28 PROCEDURE — 36415 COLL VENOUS BLD VENIPUNCTURE: CPT

## 2022-09-28 PROCEDURE — 80061 LIPID PANEL: CPT

## 2022-09-28 PROCEDURE — 84443 ASSAY THYROID STIM HORMONE: CPT

## 2022-09-28 PROCEDURE — 84439 ASSAY OF FREE THYROXINE: CPT

## 2022-09-28 PROCEDURE — 80053 COMPREHEN METABOLIC PANEL: CPT

## 2022-10-24 DIAGNOSIS — I10 ESSENTIAL HYPERTENSION: ICD-10-CM

## 2022-10-24 DIAGNOSIS — R00.0 TACHYCARDIA: ICD-10-CM

## 2022-10-25 NOTE — TELEPHONE ENCOUNTER
Last Visit: 6/28/22 with SMITHA Green  Next Appointment: 12/28/22 with SMITHA Green  Previous Refill Encounter(s): 5/6/22 #90 with 1 refill    Requested Prescriptions     Pending Prescriptions Disp Refills    levothyroxine (SYNTHROID) 88 mcg tablet 90 Tablet 1     Sig: Take 1 Tablet by mouth every morning. amLODIPine-benazepril (LOTREL) 5-10 mg per capsule 90 Capsule 1     Sig: Take 1 Capsule by mouth daily. atenoloL (TENORMIN) 50 mg tablet 90 Tablet 1     Sig: Take 1 Tablet by mouth daily. For [facility] in place:   Recommendation Provided To:    Intervention Detail: New Rx: 3, reason: Patient Preference  Gap Closed?:   Intervention Accepted By:   Time Spent (min): 5

## 2022-10-26 RX ORDER — AMLODIPINE AND BENAZEPRIL HYDROCHLORIDE 5; 10 MG/1; MG/1
1 CAPSULE ORAL DAILY
Qty: 90 CAPSULE | Refills: 1 | Status: SHIPPED | OUTPATIENT
Start: 2022-10-26

## 2022-10-26 RX ORDER — ATENOLOL 50 MG/1
50 TABLET ORAL DAILY
Qty: 90 TABLET | Refills: 1 | Status: SHIPPED | OUTPATIENT
Start: 2022-10-26

## 2022-10-26 RX ORDER — LEVOTHYROXINE SODIUM 88 UG/1
88 TABLET ORAL
Qty: 90 TABLET | Refills: 1 | Status: SHIPPED | OUTPATIENT
Start: 2022-10-26

## 2022-12-30 ENCOUNTER — OFFICE VISIT (OUTPATIENT)
Dept: FAMILY MEDICINE CLINIC | Age: 37
End: 2022-12-30
Payer: COMMERCIAL

## 2022-12-30 ENCOUNTER — HOSPITAL ENCOUNTER (OUTPATIENT)
Dept: LAB | Age: 37
End: 2022-12-30
Payer: COMMERCIAL

## 2022-12-30 VITALS
HEIGHT: 64 IN | HEART RATE: 76 BPM | SYSTOLIC BLOOD PRESSURE: 127 MMHG | WEIGHT: 203 LBS | DIASTOLIC BLOOD PRESSURE: 71 MMHG | TEMPERATURE: 97.5 F | BODY MASS INDEX: 34.66 KG/M2 | RESPIRATION RATE: 18 BRPM | OXYGEN SATURATION: 98 %

## 2022-12-30 DIAGNOSIS — E78.1 PURE HYPERTRIGLYCERIDEMIA: ICD-10-CM

## 2022-12-30 DIAGNOSIS — I10 ESSENTIAL HYPERTENSION: Primary | ICD-10-CM

## 2022-12-30 DIAGNOSIS — Z23 ENCOUNTER FOR IMMUNIZATION: ICD-10-CM

## 2022-12-30 LAB
CHOLEST SERPL-MCNC: 169 MG/DL
HDLC SERPL-MCNC: 32 MG/DL (ref 40–60)
HDLC SERPL: 5.3 {RATIO} (ref 0–5)
LDLC SERPL CALC-MCNC: 91.6 MG/DL (ref 0–100)
LIPID PROFILE,FLP: ABNORMAL
TRIGL SERPL-MCNC: 227 MG/DL (ref ?–150)
VLDLC SERPL CALC-MCNC: 45.4 MG/DL

## 2022-12-30 PROCEDURE — 80061 LIPID PANEL: CPT

## 2022-12-30 PROCEDURE — 36415 COLL VENOUS BLD VENIPUNCTURE: CPT

## 2022-12-30 NOTE — PROGRESS NOTES
1. \"Have you been to the ER, urgent care clinic since your last visit? Hospitalized since your last visit? \" No    2. \"Have you seen or consulted any other health care providers outside of the [address] [place] [address] since your last visit? \" Yes , Dr Maria E Urrutia      3. For patients aged 39-70: Has the patient had a colonoscopy / FIT/ Cologuard? NA - based on age      If the patient is female:    4. For patients aged 41-77: Has the patient had a mammogram within the past 2 years? NA - based on age or sex      11. For patients aged 21-65: Has the patient had a pap smear? Yes - Care Gap present.  Most recent result on file

## 2022-12-30 NOTE — PROGRESS NOTES
Daryl Cabrera is a 40 y.o. female who was seen in clinic today (12/30/2022) for Hypertension  . Assessment & Plan:   Diagnoses and all orders for this visit:    1. Essential hypertension    2. Pure hypertriglyceridemia  -     LIPID PANEL; Future    3. Encounter for immunization  -     INFLUENZA, FLUARIX, FLULAVAL, FLUZONE (AGE 6 MO+), AFLURIA(AGE 3Y+) IM, PF, 0.5 ML      had [facility] 2 hrs ago, will repeat lipid panel today  I have discussed the diagnosis with the patient and the intended plan as seen in the above orders. The patient has received an after-visit summary and questions were answered concerning future plans. I have discussed medication side effects and warnings with the patient as well. Patient agreeable with above plan and verbalizes understanding. Follow-up and Dispositions    Return in about 6 months (around 6/30/2023) for HTN/HLD, fasting labs 1 wk prior, in office follow up. Subjective:   Cardiovascular ROS: taking medications as instructed, no medication side effects noted, patient does not perform home BP monitoring, no TIA's, no chest pain on exertion, no dyspnea on exertion, no swelling of ankles. She does adhere to healthier eating habits. Patient stopped, has been drinking vodka or Truy(water-based beer) couples times per week. Hypothyroidism ros: denies fatigue, weight changes, heat/cold intolerance, bowel/skin changes or CVS symptoms. Patient is compliant with all meds. Medication side effects none.     Lab Results   Component Value Date/Time    Sodium 139 09/28/2022 09:27 AM    Potassium 4.4 09/28/2022 09:27 AM    Chloride 106 09/28/2022 09:27 AM    CO2 24 09/28/2022 09:27 AM    Anion gap 9 09/28/2022 09:27 AM    Glucose 93 09/28/2022 09:27 AM    BUN 13 09/28/2022 09:27 AM    Creatinine 0.62 09/28/2022 09:27 AM    BUN/Creatinine ratio 21 (H) 09/28/2022 09:27 AM    GFR est AA >60 09/28/2022 09:27 AM    GFR est non-AA >60 09/28/2022 09:27 AM    Calcium 9.4 09/28/2022 09:27 AM Bilirubin, total 0.5 09/28/2022 09:27 AM    Alk. phosphatase 78 09/28/2022 09:27 AM    Protein, total 7.9 09/28/2022 09:27 AM    Albumin 4.3 09/28/2022 09:27 AM    Globulin 3.6 09/28/2022 09:27 AM    A-G Ratio 1.2 09/28/2022 09:27 AM    ALT (SGPT) 38 09/28/2022 09:27 AM    AST (SGOT) 22 09/28/2022 09:27 AM     Lab Results   Component Value Date/Time    Cholesterol, total 183 09/28/2022 09:27 AM    HDL Cholesterol 30 (L) 09/28/2022 09:27 AM    LDL, calculated 103.2 (H) 09/28/2022 09:27 AM    VLDL, calculated 49.8 09/28/2022 09:27 AM    Triglyceride 249 (H) 09/28/2022 09:27 AM    CHOL/HDL Ratio 6.1 (H) 09/28/2022 09:27 AM     Lab Results   Component Value Date/Time    Hemoglobin A1c 4.9 10/11/2021 01:06 PM     No results found for: Olaf Ornelas, [id number]    Lab Results   Component Value Date/Time    WBC 12.9 12/30/2012 10:38 AM    HGB 13.2 12/30/2012 10:38 AM    HCT 37.8 12/30/2012 10:38 AM    PLATELET 098 64/[birth year]1959 10:38 AM    MCV 89.4 12/30/2012 10:38 AM       Wt Readings from Last 3 Encounters:   12/30/22 203 lb (92.1 kg)   06/28/22 206 lb 6.4 oz (93.6 kg)   11/19/21 196 lb (88.9 kg)     Temp Readings from Last 3 Encounters:   12/30/22 97.5 °F (36.4 °C) (Temporal)   06/28/22 98.4 °F (36.9 °C) (Temporal)   11/19/21 97.1 °F (36.2 °C) (Temporal)     BP Readings from Last 3 Encounters:   12/30/22 127/71   06/28/22 132/80   11/19/21 128/76     Pulse Readings from Last 3 Encounters:   12/30/22 76   06/28/22 100   11/19/21 80       Prior to Admission medications    Medication Sig Start Date End Date Taking? Authorizing Provider   levothyroxine (SYNTHROID) 88 mcg tablet Take 1 Tablet by mouth every morning. 10/26/22  Yes Nimo Zabala NP   amLODIPine-benazepril (LOTREL) 5-10 mg per capsule Take 1 Capsule by mouth daily. 10/26/22  Yes Nimo Zabala NP   atenoloL (TENORMIN) 50 mg tablet Take 1 Tablet by mouth daily.  10/26/22  Yes Nimo Zabala NP   omega-3 acid ethyl esters (LOVAZA) 1 gram capsule TAKE TWO CAPSULES BY MOUTH TWICE A DAY 10/1/22  Yes De La Vega Romp, NP   multivitamin (ONE A DAY) tablet Take 1 Tablet by mouth daily. Patient not taking: Reported on 12/30/2022    Provider, Historical         The following sections were reviewed & updated as appropriate: PMH, PSH, FH, and SH. Review of Systems   Constitutional:  Negative for activity change, appetite change, chills, fatigue and fever. Respiratory:  Negative for chest tightness and shortness of breath. Cardiovascular:  Negative for chest pain. Neurological:  Negative for dizziness and headaches. Objective:   Visit Vitals  /71 (BP 1 Location: Left arm, BP Patient Position: Sitting, BP Cuff Size: Large adult)   Pulse 76   Temp 97.5 °F (36.4 °C) (Temporal)   Resp 18   Ht 5' 4\" (1.626 m)   Wt 203 lb (92.1 kg)   LMP 12/05/2022   SpO2 98%   BMI 34.84 kg/m²      Physical Exam  Constitutional:       General: She is not in acute distress. Appearance: She is well-developed. HENT:      Head: Normocephalic and atraumatic. Neck:      Vascular: No carotid bruit. Cardiovascular:      Rate and Rhythm: Normal rate and regular rhythm. Heart sounds: Normal heart sounds. No murmur heard. No friction rub. No gallop. Pulmonary:      Effort: Pulmonary effort is normal.      Breath sounds: Normal breath sounds. No decreased breath sounds, wheezing, rhonchi or rales. Abdominal:      General: Bowel sounds are normal.      Palpations: Abdomen is soft. Tenderness: There is no abdominal tenderness. Musculoskeletal:      Cervical back: Normal range of motion and neck supple. Lymphadenopathy:      Cervical: No cervical adenopathy. Skin:     General: Skin is warm and dry. Neurological:      Mental Status: She is alert and oriented to person, place, and time. Disclaimer: The patient understands our medical plan. Alternatives have been explained and offered.   The risks, benefits and significant side effects of all medications have been reviewed. Anticipated time course and progression of condition reviewed. All questions have been addressed. She is encouraged to employ the information provided in the after visit summary, which was reviewed. Where applicable, she is instructed to call the clinic if she has not been notified either by phone or through [address] with the results of her tests or with an appointment plan for any referrals within 1 week(s). No news is not good news; it's no news. The patient  is to call if her condition worsens or fails to improve or if significant side effects are experienced. Aspects of this note may have been generated using voice recognition software. Despite editing, there may be unrecognized errors.        Erwin Orlando, NP

## 2023-08-29 NOTE — TELEPHONE ENCOUNTER
Last appointment: 12/30/2022    Next appointment: none    Patient requesting refill for     Omega-3 Fatty Acids (FISH OIL) 1000 MG capsule       Pharmacy    [address] [place] [address] [id number] [address], [address] [place] [address] Street   Phone:  [phone]  Fax:  [phone]

## 2023-09-08 RX ORDER — CHLORAL HYDRATE 500 MG
CAPSULE ORAL
Qty: 360 CAPSULE | Refills: 0 | Status: SHIPPED | OUTPATIENT
Start: 2023-09-08

## 2023-10-28 RX ORDER — CHLORAL HYDRATE 500 MG
CAPSULE ORAL
Qty: 360 CAPSULE | Refills: 0 | Status: CANCELLED | OUTPATIENT
Start: 2023-10-28

## 2023-11-02 RX ORDER — ATENOLOL 50 MG/1
50 TABLET ORAL DAILY
Qty: 90 TABLET | Refills: 0 | Status: SHIPPED | OUTPATIENT
Start: 2023-11-02

## 2023-11-02 RX ORDER — AMLODIPINE BESYLATE AND BENAZEPRIL HYDROCHLORIDE 5; 10 MG/1; MG/1
1 CAPSULE ORAL DAILY
Qty: 90 CAPSULE | Refills: 0 | Status: SHIPPED | OUTPATIENT
Start: 2023-11-02

## 2023-11-02 RX ORDER — LEVOTHYROXINE SODIUM 88 UG/1
88 TABLET ORAL DAILY
Qty: 90 TABLET | Refills: 0 | Status: SHIPPED | OUTPATIENT
Start: 2023-11-02

## 2024-01-27 SDOH — ECONOMIC STABILITY: TRANSPORTATION INSECURITY
IN THE PAST 12 MONTHS, HAS LACK OF TRANSPORTATION KEPT YOU FROM MEETINGS, WORK, OR FROM GETTING THINGS NEEDED FOR DAILY LIVING?: NO

## 2024-01-27 SDOH — ECONOMIC STABILITY: FOOD INSECURITY: WITHIN THE PAST 12 MONTHS, THE FOOD YOU BOUGHT JUST DIDN'T LAST AND YOU DIDN'T HAVE MONEY TO GET MORE.: NEVER TRUE

## 2024-01-27 SDOH — ECONOMIC STABILITY: FOOD INSECURITY: WITHIN THE PAST 12 MONTHS, YOU WORRIED THAT YOUR FOOD WOULD RUN OUT BEFORE YOU GOT MONEY TO BUY MORE.: NEVER TRUE

## 2024-01-27 SDOH — ECONOMIC STABILITY: INCOME INSECURITY: HOW HARD IS IT FOR YOU TO PAY FOR THE VERY BASICS LIKE FOOD, HOUSING, MEDICAL CARE, AND HEATING?: NOT VERY HARD

## 2024-01-27 SDOH — ECONOMIC STABILITY: HOUSING INSECURITY
IN THE LAST 12 MONTHS, WAS THERE A TIME WHEN YOU DID NOT HAVE A STEADY PLACE TO SLEEP OR SLEPT IN A SHELTER (INCLUDING NOW)?: NO

## 2024-01-30 ENCOUNTER — HOSPITAL ENCOUNTER (OUTPATIENT)
Facility: HOSPITAL | Age: 39
Discharge: HOME | End: 2024-02-02
Payer: COMMERCIAL

## 2024-01-30 ENCOUNTER — OFFICE VISIT (OUTPATIENT)
Age: 39
End: 2024-01-30
Payer: COMMERCIAL

## 2024-01-30 VITALS
HEART RATE: 79 BPM | BODY MASS INDEX: 34.93 KG/M2 | TEMPERATURE: 98.2 F | WEIGHT: 204.6 LBS | HEIGHT: 64 IN | RESPIRATION RATE: 18 BRPM | SYSTOLIC BLOOD PRESSURE: 129 MMHG | DIASTOLIC BLOOD PRESSURE: 70 MMHG | OXYGEN SATURATION: 98 %

## 2024-01-30 DIAGNOSIS — I10 ESSENTIAL (PRIMARY) HYPERTENSION: ICD-10-CM

## 2024-01-30 DIAGNOSIS — E66.9 OBESITY, CLASS II, BMI 35-39.9: ICD-10-CM

## 2024-01-30 DIAGNOSIS — E78.00 PURE HYPERCHOLESTEROLEMIA: ICD-10-CM

## 2024-01-30 DIAGNOSIS — E03.9 HYPOTHYROIDISM, UNSPECIFIED TYPE: ICD-10-CM

## 2024-01-30 DIAGNOSIS — I10 ESSENTIAL (PRIMARY) HYPERTENSION: Primary | ICD-10-CM

## 2024-01-30 LAB
ALBUMIN SERPL-MCNC: 4.2 G/DL (ref 3.4–5)
ALBUMIN/GLOB SERPL: 1.2 (ref 0.8–1.7)
ALP SERPL-CCNC: 84 U/L (ref 45–117)
ALT SERPL-CCNC: 30 U/L (ref 13–56)
ANION GAP SERPL CALC-SCNC: 4 MMOL/L (ref 3–18)
AST SERPL-CCNC: 12 U/L (ref 10–38)
BASOPHILS # BLD: 0.1 K/UL (ref 0–0.1)
BASOPHILS NFR BLD: 1 % (ref 0–2)
BILIRUB SERPL-MCNC: 0.3 MG/DL (ref 0.2–1)
BUN SERPL-MCNC: 11 MG/DL (ref 7–18)
BUN/CREAT SERPL: 17 (ref 12–20)
CALCIUM SERPL-MCNC: 9.3 MG/DL (ref 8.5–10.1)
CHLORIDE SERPL-SCNC: 109 MMOL/L (ref 100–111)
CHOLEST SERPL-MCNC: 180 MG/DL
CO2 SERPL-SCNC: 25 MMOL/L (ref 21–32)
CREAT SERPL-MCNC: 0.63 MG/DL (ref 0.6–1.3)
DIFFERENTIAL METHOD BLD: ABNORMAL
EOSINOPHIL # BLD: 0.2 K/UL (ref 0–0.4)
EOSINOPHIL NFR BLD: 2 % (ref 0–5)
ERYTHROCYTE [DISTWIDTH] IN BLOOD BY AUTOMATED COUNT: 12.1 % (ref 11.6–14.5)
EST. AVERAGE GLUCOSE BLD GHB EST-MCNC: 97 MG/DL
GLOBULIN SER CALC-MCNC: 3.6 G/DL (ref 2–4)
GLUCOSE SERPL-MCNC: 94 MG/DL (ref 74–99)
HBA1C MFR BLD: 5 % (ref 4.2–5.6)
HCT VFR BLD AUTO: 41.8 % (ref 35–45)
HDLC SERPL-MCNC: 30 MG/DL (ref 40–60)
HDLC SERPL: 6 (ref 0–5)
HGB BLD-MCNC: 14.1 G/DL (ref 12–16)
IMM GRANULOCYTES # BLD AUTO: 0.1 K/UL (ref 0–0.04)
IMM GRANULOCYTES NFR BLD AUTO: 1 % (ref 0–0.5)
LDLC SERPL CALC-MCNC: 96 MG/DL (ref 0–100)
LIPID PANEL: ABNORMAL
LYMPHOCYTES # BLD: 3.1 K/UL (ref 0.9–3.6)
LYMPHOCYTES NFR BLD: 32 % (ref 21–52)
MCH RBC QN AUTO: 31.7 PG (ref 24–34)
MCHC RBC AUTO-ENTMCNC: 33.7 G/DL (ref 31–37)
MCV RBC AUTO: 93.9 FL (ref 78–100)
MONOCYTES # BLD: 0.7 K/UL (ref 0.05–1.2)
MONOCYTES NFR BLD: 7 % (ref 3–10)
NEUTS SEG # BLD: 5.5 K/UL (ref 1.8–8)
NEUTS SEG NFR BLD: 58 % (ref 40–73)
NRBC # BLD: 0 K/UL (ref 0–0.01)
NRBC BLD-RTO: 0 PER 100 WBC
PLATELET # BLD AUTO: 359 K/UL (ref 135–420)
PMV BLD AUTO: 10.2 FL (ref 9.2–11.8)
POTASSIUM SERPL-SCNC: 4.3 MMOL/L (ref 3.5–5.5)
PROT SERPL-MCNC: 7.8 G/DL (ref 6.4–8.2)
RBC # BLD AUTO: 4.45 M/UL (ref 4.2–5.3)
SODIUM SERPL-SCNC: 138 MMOL/L (ref 136–145)
T4 FREE SERPL-MCNC: 1.1 NG/DL (ref 0.7–1.5)
TRIGL SERPL-MCNC: 270 MG/DL
TSH SERPL DL<=0.05 MIU/L-ACNC: 1.1 UIU/ML (ref 0.36–3.74)
VLDLC SERPL CALC-MCNC: 54 MG/DL
WBC # BLD AUTO: 9.6 K/UL (ref 4.6–13.2)

## 2024-01-30 PROCEDURE — 85025 COMPLETE CBC W/AUTO DIFF WBC: CPT

## 2024-01-30 PROCEDURE — 80061 LIPID PANEL: CPT

## 2024-01-30 PROCEDURE — 80053 COMPREHEN METABOLIC PANEL: CPT

## 2024-01-30 PROCEDURE — 3074F SYST BP LT 130 MM HG: CPT | Performed by: NURSE PRACTITIONER

## 2024-01-30 PROCEDURE — 83036 HEMOGLOBIN GLYCOSYLATED A1C: CPT

## 2024-01-30 PROCEDURE — 84443 ASSAY THYROID STIM HORMONE: CPT

## 2024-01-30 PROCEDURE — 3078F DIAST BP <80 MM HG: CPT | Performed by: NURSE PRACTITIONER

## 2024-01-30 PROCEDURE — 36415 COLL VENOUS BLD VENIPUNCTURE: CPT

## 2024-01-30 PROCEDURE — 84439 ASSAY OF FREE THYROXINE: CPT

## 2024-01-30 PROCEDURE — 99214 OFFICE O/P EST MOD 30 MIN: CPT | Performed by: NURSE PRACTITIONER

## 2024-01-30 RX ORDER — LEVOTHYROXINE SODIUM 88 UG/1
88 TABLET ORAL DAILY
Qty: 90 TABLET | Refills: 2 | Status: SHIPPED | OUTPATIENT
Start: 2024-01-30

## 2024-01-30 RX ORDER — AMLODIPINE BESYLATE AND BENAZEPRIL HYDROCHLORIDE 5; 10 MG/1; MG/1
1 CAPSULE ORAL DAILY
Qty: 90 CAPSULE | Refills: 2 | Status: SHIPPED | OUTPATIENT
Start: 2024-01-30

## 2024-01-30 RX ORDER — CHLORAL HYDRATE 500 MG
CAPSULE ORAL
Qty: 360 CAPSULE | Refills: 2 | Status: SHIPPED | OUTPATIENT
Start: 2024-01-30

## 2024-01-30 RX ORDER — ATENOLOL 50 MG/1
50 TABLET ORAL DAILY
Qty: 90 TABLET | Refills: 2 | Status: SHIPPED | OUTPATIENT
Start: 2024-01-30

## 2024-01-30 ASSESSMENT — PATIENT HEALTH QUESTIONNAIRE - PHQ9
SUM OF ALL RESPONSES TO PHQ QUESTIONS 1-9: 0
SUM OF ALL RESPONSES TO PHQ QUESTIONS 1-9: 0
1. LITTLE INTEREST OR PLEASURE IN DOING THINGS: 0
SUM OF ALL RESPONSES TO PHQ QUESTIONS 1-9: 0
2. FEELING DOWN, DEPRESSED OR HOPELESS: 0
SUM OF ALL RESPONSES TO PHQ9 QUESTIONS 1 & 2: 0
SUM OF ALL RESPONSES TO PHQ QUESTIONS 1-9: 0

## 2024-01-30 NOTE — PROGRESS NOTES
Lexi Johnson is a 38 y.o. female is a Established patient who was seen in clinic today alone (1/30/2024) for Cholesterol Problem and Hypertension    Assessment & Plan:   Lexi was seen today for cholesterol problem and hypertension.    Diagnoses and all orders for this visit:    Essential (primary) hypertension    Hypothyroidism, unspecified type    Pure hypercholesterolemia        ***    {Plan; generic:02271}     I have discussed the diagnosis with the patient and the intended plan as seen in the above orders.  The patient has received an after-visit summary and questions were answered concerning future plans.  I have discussed medication side effects and warnings with the patient as well. Patient agreeable with above plan and verbalizes understanding.    No follow-ups on file.    Subjective:   Treatment Adherence:   Medication compliance:  {Desc; compliance:5303::\"compliant most of the time\"}  Diet compliance:  {Desc; compliance:5303::\"compliant most of the time\"}  Weight trend: {INCREASING/DECREASING/STABLE:52904}  Current exercise: {EXERCISE TYPE:334605109}  Barriers: {Barriers to success:60666}    Hypertension:  Home blood pressure monitoring: {NO/YES:8203936141}. Patient {denies/complains:00054} {Symptoms of Hypertension, Denies:56679}.  Antihypertensive medication side effects: {Hypertension med side effects:5728::\"no medication side effects noted\"}.  Use of agents associated with hypertension: {bp agents assoc with hypertension:511::\"none\"}.                                        Sodium (mmol/L)   Date Value   09/28/2022 139    BUN (MG/DL)   Date Value   09/28/2022 13    Glucose (mg/dL)   Date Value   09/28/2022 93      Potassium (mmol/L)   Date Value   09/28/2022 4.4    Creatinine (MG/DL)   Date Value   09/28/2022 0.62         Hyperlipidemia:  No new myalgias or GI upset on {RP HYPERLIPIDEMIA MEDS:37826}.     Lab Results   Component Value Date    CHOL 169 12/30/2022    TRIG 227 (H) 12/30/2022    HDL 32  Left lower leg: No edema.   Neurological:      Mental Status: She is alert.       Disclaimer:    The patient understands our medical plan.  Alternatives have been explained and offered.  The risks, benefits and significant side effects of all medications have been reviewed. Anticipated time course and progression of condition reviewed. All questions have been addressed.  She is encouraged to employ the information provided in the after visit summary, which was reviewed.      Where applicable, she is instructed to call the clinic if she has not been notified either by phone or through MyChart with the results of her tests or with an appointment plan for any referrals within 1 week(s).  No news is not good news; it's no news. The patient  is to call if her condition worsens or fails to improve or if significant side effects are experienced.     Aspects of this note may have been generated using voice recognition software. Despite editing, there may be unrecognized errors.       JOHN Phillips - NP   An electronic signature was used to authenticate this note.

## 2024-01-30 NOTE — PROGRESS NOTES
1. \"Have you been to the ER, urgent care clinic since your last visit?  Hospitalized since your last visit?\" No    2. \"Have you seen or consulted any other health care providers outside of the [facility] since your last visit?\"  [facility]       3. For patients aged 45-75: Has the patient had a colonoscopy / FIT/ Cologuard? NA - based on age      If the patient is female:    4. For patients aged 40-74: Has the patient had a mammogram within the past 2 years? NA - based on age or sex      5. For patients aged 21-65: Has the patient had a pap smear? No

## 2024-03-07 ASSESSMENT — ENCOUNTER SYMPTOMS
CHEST TIGHTNESS: 0
SHORTNESS OF BREATH: 0

## 2024-10-23 NOTE — TELEPHONE ENCOUNTER
Last appointment: 01/30/2024    Next appointment: none    Pharmacy requesting 90 day refills for     atenolol (TENORMIN) 50 MG tablet [[id number]]    amLODIPine-benazepril (LOTREL) 5-10 MG per capsule [[id number]]    levothyroxine (SYNTHROID) 88 MCG tablet [[id number]]    Pharmacy   [facility] [id number] [address] - P [phone] - F [phone] (Pharmacy)

## 2024-10-24 NOTE — TELEPHONE ENCOUNTER
Per NELDA Chin please schedule patient an appointment    Return in about 6 months (around 7/30/2024) for HTN/HLD, thyroid, fasting labs 1 week prior, in office.

## 2024-11-01 RX ORDER — AMLODIPINE AND BENAZEPRIL HYDROCHLORIDE 5; 10 MG/1; MG/1
1 CAPSULE ORAL DAILY
Qty: 90 CAPSULE | Refills: 0 | Status: SHIPPED | OUTPATIENT
Start: 2024-11-01

## 2024-11-01 RX ORDER — LEVOTHYROXINE SODIUM 88 UG/1
88 TABLET ORAL DAILY
Qty: 90 TABLET | Refills: 0 | Status: SHIPPED | OUTPATIENT
Start: 2024-11-01

## 2024-11-01 RX ORDER — ATENOLOL 50 MG/1
50 TABLET ORAL DAILY
Qty: 90 TABLET | Refills: 0 | Status: SHIPPED | OUTPATIENT
Start: 2024-11-01

## 2025-01-16 DIAGNOSIS — I10 ESSENTIAL (PRIMARY) HYPERTENSION: Primary | ICD-10-CM

## 2025-01-16 DIAGNOSIS — Z13.6 SCREENING FOR CARDIOVASCULAR, RESPIRATORY, AND GENITOURINARY DISEASES: ICD-10-CM

## 2025-01-16 DIAGNOSIS — E78.00 PURE HYPERCHOLESTEROLEMIA: ICD-10-CM

## 2025-01-16 DIAGNOSIS — Z13.89 SCREENING FOR CARDIOVASCULAR, RESPIRATORY, AND GENITOURINARY DISEASES: ICD-10-CM

## 2025-01-16 DIAGNOSIS — Z13.83 SCREENING FOR CARDIOVASCULAR, RESPIRATORY, AND GENITOURINARY DISEASES: ICD-10-CM

## 2025-01-16 DIAGNOSIS — E03.9 HYPOTHYROIDISM, UNSPECIFIED TYPE: ICD-10-CM

## 2025-01-17 LAB
ALBUMIN SERPL-MCNC: 5.1 G/DL (ref 3.9–4.9)
ALP SERPL-CCNC: 88 IU/L (ref 44–121)
ALT SERPL-CCNC: 34 IU/L (ref 0–32)
AST SERPL-CCNC: 29 IU/L (ref 0–40)
BASOPHILS # BLD AUTO: 0.1 X10E3/UL (ref 0–0.2)
BASOPHILS NFR BLD AUTO: 1 %
BILIRUB SERPL-MCNC: 0.6 MG/DL (ref 0–1.2)
BUN SERPL-MCNC: 10 MG/DL (ref 6–20)
BUN/CREAT SERPL: 15 (ref 9–23)
CALCIUM SERPL-MCNC: 10.1 MG/DL (ref 8.7–10.2)
CHLORIDE SERPL-SCNC: 99 MMOL/L (ref 96–106)
CHOLEST SERPL-MCNC: 189 MG/DL (ref 100–199)
CO2 SERPL-SCNC: 17 MMOL/L (ref 20–29)
CREAT SERPL-MCNC: 0.65 MG/DL (ref 0.57–1)
EGFRCR SERPLBLD CKD-EPI 2021: 115 ML/MIN/1.73
EOSINOPHIL # BLD AUTO: 0.2 X10E3/UL (ref 0–0.4)
EOSINOPHIL NFR BLD AUTO: 2 %
ERYTHROCYTE [DISTWIDTH] IN BLOOD BY AUTOMATED COUNT: 12.6 % (ref 11.7–15.4)
GLOBULIN SER CALC-MCNC: 3.5 G/DL (ref 1.5–4.5)
GLUCOSE SERPL-MCNC: 81 MG/DL (ref 70–99)
HBA1C MFR BLD: 5.3 % (ref 4.8–5.6)
HCT VFR BLD AUTO: 44.9 % (ref 34–46.6)
HDLC SERPL-MCNC: 34 MG/DL
HGB BLD-MCNC: 15 G/DL (ref 11.1–15.9)
IMM GRANULOCYTES # BLD AUTO: 0 X10E3/UL (ref 0–0.1)
IMM GRANULOCYTES NFR BLD AUTO: 1 %
LDLC SERPL CALC-MCNC: 113 MG/DL (ref 0–99)
LYMPHOCYTES # BLD AUTO: 2.9 X10E3/UL (ref 0.7–3.1)
LYMPHOCYTES NFR BLD AUTO: 32 %
MCH RBC QN AUTO: 32.2 PG (ref 26.6–33)
MCHC RBC AUTO-ENTMCNC: 33.4 G/DL (ref 31.5–35.7)
MCV RBC AUTO: 96 FL (ref 79–97)
MONOCYTES # BLD AUTO: 0.5 X10E3/UL (ref 0.1–0.9)
MONOCYTES NFR BLD AUTO: 6 %
NEUTROPHILS # BLD AUTO: 5.2 X10E3/UL (ref 1.4–7)
NEUTROPHILS NFR BLD AUTO: 58 %
PLATELET # BLD AUTO: 287 X10E3/UL (ref 150–450)
POTASSIUM SERPL-SCNC: 4.4 MMOL/L (ref 3.5–5.2)
PROT SERPL-MCNC: 8.6 G/DL (ref 6–8.5)
RBC # BLD AUTO: 4.66 X10E6/UL (ref 3.77–5.28)
SODIUM SERPL-SCNC: 137 MMOL/L (ref 134–144)
T4 FREE SERPL-MCNC: 1.28 NG/DL (ref 0.82–1.77)
TRIGL SERPL-MCNC: 238 MG/DL (ref 0–149)
TSH SERPL DL<=0.005 MIU/L-ACNC: 1.9 UIU/ML (ref 0.45–4.5)
VLDLC SERPL CALC-MCNC: 42 MG/DL (ref 5–40)
WBC # BLD AUTO: 8.9 X10E3/UL (ref 3.4–10.8)

## 2025-01-21 SDOH — ECONOMIC STABILITY: INCOME INSECURITY: IN THE LAST 12 MONTHS, WAS THERE A TIME WHEN YOU WERE NOT ABLE TO PAY THE MORTGAGE OR RENT ON TIME?: NO

## 2025-01-21 SDOH — ECONOMIC STABILITY: FOOD INSECURITY: WITHIN THE PAST 12 MONTHS, THE FOOD YOU BOUGHT JUST DIDN'T LAST AND YOU DIDN'T HAVE MONEY TO GET MORE.: NEVER TRUE

## 2025-01-21 SDOH — ECONOMIC STABILITY: FOOD INSECURITY: WITHIN THE PAST 12 MONTHS, YOU WORRIED THAT YOUR FOOD WOULD RUN OUT BEFORE YOU GOT MONEY TO BUY MORE.: NEVER TRUE

## 2025-01-21 SDOH — ECONOMIC STABILITY: TRANSPORTATION INSECURITY
IN THE PAST 12 MONTHS, HAS THE LACK OF TRANSPORTATION KEPT YOU FROM MEDICAL APPOINTMENTS OR FROM GETTING MEDICATIONS?: NO

## 2025-01-22 ENCOUNTER — OFFICE VISIT (OUTPATIENT)
Facility: CLINIC | Age: 40
End: 2025-01-22
Payer: COMMERCIAL

## 2025-01-22 VITALS
SYSTOLIC BLOOD PRESSURE: 132 MMHG | RESPIRATION RATE: 18 BRPM | DIASTOLIC BLOOD PRESSURE: 68 MMHG | HEART RATE: 78 BPM | OXYGEN SATURATION: 99 % | TEMPERATURE: 97.9 F | WEIGHT: 216 LBS | HEIGHT: 64 IN | BODY MASS INDEX: 36.88 KG/M2

## 2025-01-22 DIAGNOSIS — I10 ESSENTIAL (PRIMARY) HYPERTENSION: Primary | ICD-10-CM

## 2025-01-22 DIAGNOSIS — E78.00 PURE HYPERCHOLESTEROLEMIA: ICD-10-CM

## 2025-01-22 DIAGNOSIS — E06.3 AUTOIMMUNE THYROIDITIS: ICD-10-CM

## 2025-01-22 PROBLEM — E03.9 HYPOTHYROIDISM: Status: RESOLVED | Noted: 2025-01-16 | Resolved: 2025-01-22

## 2025-01-22 PROCEDURE — 3075F SYST BP GE 130 - 139MM HG: CPT | Performed by: NURSE PRACTITIONER

## 2025-01-22 PROCEDURE — 99214 OFFICE O/P EST MOD 30 MIN: CPT | Performed by: NURSE PRACTITIONER

## 2025-01-22 PROCEDURE — 3078F DIAST BP <80 MM HG: CPT | Performed by: NURSE PRACTITIONER

## 2025-01-22 RX ORDER — FENOFIBRATE 145 MG/1
145 TABLET, COATED ORAL DAILY
Qty: 90 TABLET | Refills: 0 | Status: SHIPPED | OUTPATIENT
Start: 2025-01-22

## 2025-01-22 RX ORDER — ATENOLOL 50 MG/1
50 TABLET ORAL DAILY
Qty: 90 TABLET | Refills: 2 | Status: SHIPPED | OUTPATIENT
Start: 2025-01-22

## 2025-01-22 RX ORDER — AMLODIPINE AND BENAZEPRIL HYDROCHLORIDE 5; 10 MG/1; MG/1
1 CAPSULE ORAL DAILY
Qty: 90 CAPSULE | Refills: 2 | Status: SHIPPED | OUTPATIENT
Start: 2025-01-22

## 2025-01-22 RX ORDER — LEVOTHYROXINE SODIUM 88 UG/1
88 TABLET ORAL DAILY
Qty: 90 TABLET | Refills: 2 | Status: SHIPPED | OUTPATIENT
Start: 2025-01-22

## 2025-01-22 ASSESSMENT — ENCOUNTER SYMPTOMS
SHORTNESS OF BREATH: 0
CHEST TIGHTNESS: 0

## 2025-01-22 NOTE — PROGRESS NOTES
\"Have you been to the ER, urgent care clinic since your last visit?  Hospitalized since your last visit?\"    NO    “Have you seen or consulted any other health care providers outside our system since your last visit?”    NO     “Have you had a pap smear?”    NO    Date of last Cervical Cancer screen (HPV or PAP): 2/19/2013

## 2025-01-22 NOTE — PROGRESS NOTES
Lexi Johnson (:  1985) is a 39 y.o. female, Established patient, presents alone, here for evaluation of the following chief complaint(s):  Cholesterol Problem, Hypertension, and thyroid  History obtained from patient.  Assessment & Plan  1. Hyperlipidemia.  Her triglyceride levels have decreased from 270 to 238, while her HDL remains stable at 34, and her LDL is slightly elevated at 113. A prescription for fenofibrate 145 mg, to be taken once daily, has been provided. She has been informed about the potential side effects of this medication, including rash (1%) and abdominal pain (5%). She is advised to take the medication with food if she experiences any discomfort. A 3-month supply of fenofibrate will be sent to [facility].    2. Hypertension.  Her blood pressure is well-controlled on her current medication regimen. She is advised to continue her current medications as prescribed.    Follow-up  The patient will follow up in 3 months for a virtual medication check.    Results  Laboratory Studies  Triglycerides 238, down from 270. HDL 34. .  Lexi was seen today for cholesterol problem, hypertension and thyroid.    Diagnoses and all orders for this visit:    Essential (primary) hypertension    Pure hypercholesterolemia    Autoimmune thyroiditis    Other orders  -     amLODIPine-benazepril (LOTREL) 5-10 MG per capsule; Take 1 capsule by mouth daily  -     atenolol (TENORMIN) 50 MG tablet; Take 1 tablet by mouth daily  -     levothyroxine (SYNTHROID) 88 MCG tablet; Take 1 tablet by mouth Daily  -     fenofibrate (TRICOR) 145 MG tablet; Take 1 tablet by mouth daily      orders and follow up as documented in [facility], lab results reviewed with patient, reviewed compliance with lifestyle measures, reviewed diet, exercise and weight control    I have discussed the diagnosis with the patient and the intended plan as seen in the above orders.  The patient has received an after-visit summary

## 2025-04-16 ENCOUNTER — HOSPITAL ENCOUNTER (OUTPATIENT)
Facility: HOSPITAL | Age: 40
Setting detail: SPECIMEN
Discharge: HOME | End: 2025-04-19

## 2025-04-16 LAB — LABCORP SPECIMEN COLLECTION: NORMAL

## 2025-04-16 PROCEDURE — 99001 SPECIMEN HANDLING PT-LAB: CPT

## 2025-04-17 LAB
ALBUMIN SERPL-MCNC: 4.6 G/DL (ref 3.9–4.9)
ALP SERPL-CCNC: 61 IU/L (ref 44–121)
ALT SERPL-CCNC: 25 IU/L (ref 0–32)
AST SERPL-CCNC: 19 IU/L (ref 0–40)
BILIRUB DIRECT SERPL-MCNC: <0.08 MG/DL (ref 0–0.4)
BILIRUB SERPL-MCNC: <0.2 MG/DL (ref 0–1.2)
CHOLEST SERPL-MCNC: 179 MG/DL (ref 100–199)
HDLC SERPL-MCNC: 34 MG/DL
LDLC SERPL CALC-MCNC: 112 MG/DL (ref 0–99)
PROT SERPL-MCNC: 7.3 G/DL (ref 6–8.5)
TRIGL SERPL-MCNC: 190 MG/DL (ref 0–149)
VLDLC SERPL CALC-MCNC: 33 MG/DL (ref 5–40)

## 2025-04-18 NOTE — TELEPHONE ENCOUNTER
Last Appointment- 1/22/25    Next Appointment- 4/22/25    Previous Refill Encounter(s): Date: 1/22/25 #90 Refills 0    Requested Prescriptions     Pending Prescriptions Disp Refills    fenofibrate (TRICOR) 145 MG tablet [Pharmacy Med Name: FENOFIBRATE 145 MG TABLET] 90 tablet 0     Sig: TAKE 1 TABLET BY MOUTH DAILY

## 2025-04-22 ENCOUNTER — RESULTS FOLLOW-UP (OUTPATIENT)
Facility: CLINIC | Age: 40
End: 2025-04-22

## 2025-04-22 ENCOUNTER — TELEMEDICINE (OUTPATIENT)
Facility: CLINIC | Age: 40
End: 2025-04-22
Payer: COMMERCIAL

## 2025-04-22 DIAGNOSIS — E66.812 OBESITY, CLASS II, BMI 35-39.9: ICD-10-CM

## 2025-04-22 DIAGNOSIS — E06.3 AUTOIMMUNE THYROIDITIS: ICD-10-CM

## 2025-04-22 DIAGNOSIS — E78.00 PURE HYPERCHOLESTEROLEMIA: Primary | ICD-10-CM

## 2025-04-22 PROCEDURE — 99213 OFFICE O/P EST LOW 20 MIN: CPT | Performed by: NURSE PRACTITIONER

## 2025-04-22 RX ORDER — FENOFIBRATE 145 MG/1
145 TABLET, FILM COATED ORAL DAILY
Qty: 90 TABLET | Refills: 0 | OUTPATIENT
Start: 2025-04-22

## 2025-04-22 RX ORDER — FENOFIBRATE 160 MG/1
160 TABLET ORAL DAILY
Qty: 90 TABLET | Refills: 3 | Status: SHIPPED | OUTPATIENT
Start: 2025-04-22

## 2025-04-22 ASSESSMENT — PATIENT HEALTH QUESTIONNAIRE - PHQ9
2. FEELING DOWN, DEPRESSED OR HOPELESS: NOT AT ALL
SUM OF ALL RESPONSES TO PHQ QUESTIONS 1-9: 0
SUM OF ALL RESPONSES TO PHQ QUESTIONS 1-9: 0
1. LITTLE INTEREST OR PLEASURE IN DOING THINGS: NOT AT ALL
SUM OF ALL RESPONSES TO PHQ QUESTIONS 1-9: 0
SUM OF ALL RESPONSES TO PHQ QUESTIONS 1-9: 0

## 2025-04-22 NOTE — PROGRESS NOTES
Lexi Johnson is a 39 y.o. (1985) female is a Established patient, presents alone, who was seen by synchronous (real-time) audio-video technology on 4/22/2025 for evaluation of the following:   Chief Complaint   Patient presents with    Cholesterol Problem   History obtained from patient.    Assessment & Plan:     Assessment & Plan  1. Hyperlipidemia.  - Triglyceride levels have decreased from 238 to 190, nearing the normal range of <150.  - HDL levels remain stable at 34, despite a history of low levels. VLDL levels have decreased from 42 to 33, while LDL levels remain relatively unchanged at 112.  - Discussed the importance of increasing omega-3 fatty acids intake. Reviewed family history and noted the patient's sedentary lifestyle.  - Prescribed fenofibrate 160 mg, with a 90-day supply and 3 refills, to be filled at [facility] in [place]. Follow-up in 6 months with laboratory tests to be conducted a week prior to the appointment.    Pure hypercholesterolemia    Orders Placed This Encounter    fenofibrate 160 MG tablet     Sig: Take 1 tablet by mouth daily     Dispense:  90 tablet     Refill:  3      Return in about 6 months (around 10/22/2025) for Routine Physical, fasting labs 1 week prior, in office ONLY.    Subjective:     History of Present Illness  The patient presents for a 3-month follow-up for hyperlipidemia.    No adverse effects from the current Tricor regimen are reported. Triglyceride levels have decreased from 238 to 190, nearing the normal range of less than 150. Good cholesterol remains stable at 34, despite a history of low levels. VLDL cholesterol has decreased from 42 to 33, approaching the target of less than 40. LDL cholesterol remains relatively unchanged, currently at 112, slightly above the desired level of less than 100.    Dietary habits are generally healthy, though there has been an increase in sedentary behavior. A family history of high triglycerides is noted, particularly in the

## 2025-04-22 NOTE — PROGRESS NOTES
Lexi Johnson, was evaluated through a synchronous (real-time) audio-video encounter. The patient (or guardian if applicable) is aware that this is a billable service, which includes applicable co-pays. This Virtual Visit was conducted with patient's (and/or legal guardian's) consent. Patient identification was verified, and a caregiver was present when appropriate.   The patient was located at Home: [address] [place] [address]   [facility] [id number]  Provider was located at Facility (Appt Dept): [address], [facility] [address]  [place] [address]  Confirm you are appropriately licensed, registered, or certified to deliver care in the state where the patient is located as indicated above. If you are not or unsure, please re-schedule the visit: Yes, I confirm.     Lexi Johnson (:  1985) is a Established patient, presenting virtually for evaluation of the following:      Below is the assessment and plan developed based on review of pertinent history, physical exam, labs, studies, and medications.       --Soraida Felix

## 2025-08-29 RX ORDER — LEVOTHYROXINE SODIUM 88 UG/1
88 TABLET ORAL DAILY
Qty: 90 TABLET | Refills: 1 | Status: SHIPPED | OUTPATIENT
Start: 2025-08-29

## 2025-08-29 RX ORDER — ATENOLOL 50 MG/1
50 TABLET ORAL DAILY
Qty: 90 TABLET | Refills: 1 | Status: SHIPPED | OUTPATIENT
Start: 2025-08-29

## 2025-08-29 RX ORDER — AMLODIPINE AND BENAZEPRIL HYDROCHLORIDE 5; 10 MG/1; MG/1
1 CAPSULE ORAL DAILY
Qty: 90 CAPSULE | Refills: 1 | Status: SHIPPED | OUTPATIENT
Start: 2025-08-29